# Patient Record
Sex: MALE | Race: WHITE | URBAN - METROPOLITAN AREA
[De-identification: names, ages, dates, MRNs, and addresses within clinical notes are randomized per-mention and may not be internally consistent; named-entity substitution may affect disease eponyms.]

---

## 2017-12-08 ENCOUNTER — APPOINTMENT (OUTPATIENT)
Dept: MRI IMAGING | Facility: CLINIC | Age: 72
DRG: 034 | End: 2017-12-08
Attending: NURSE PRACTITIONER
Payer: COMMERCIAL

## 2017-12-08 ENCOUNTER — APPOINTMENT (OUTPATIENT)
Dept: CARDIOLOGY | Facility: CLINIC | Age: 72
DRG: 034 | End: 2017-12-08
Attending: NURSE PRACTITIONER
Payer: COMMERCIAL

## 2017-12-08 ENCOUNTER — HOSPITAL ENCOUNTER (INPATIENT)
Facility: CLINIC | Age: 72
LOS: 7 days | Discharge: NURSING, REHAB OR LONG-TERM CARE | DRG: 034 | End: 2017-12-15
Attending: PSYCHIATRY & NEUROLOGY | Admitting: PSYCHIATRY & NEUROLOGY
Payer: COMMERCIAL

## 2017-12-08 DIAGNOSIS — E10.8 TYPE 1 DIABETES MELLITUS WITH COMPLICATION (H): ICD-10-CM

## 2017-12-08 DIAGNOSIS — E03.9 HYPOTHYROIDISM, UNSPECIFIED TYPE: ICD-10-CM

## 2017-12-08 DIAGNOSIS — I63.232 CEREBROVASCULAR ACCIDENT (CVA) DUE TO STENOSIS OF LEFT CAROTID ARTERY (H): ICD-10-CM

## 2017-12-08 DIAGNOSIS — K59.00 CONSTIPATION, UNSPECIFIED CONSTIPATION TYPE: ICD-10-CM

## 2017-12-08 DIAGNOSIS — D59.10 AUTOIMMUNE HEMOLYTIC ANEMIA (H): ICD-10-CM

## 2017-12-08 DIAGNOSIS — I10 BENIGN ESSENTIAL HYPERTENSION: ICD-10-CM

## 2017-12-08 DIAGNOSIS — K21.9 GASTROESOPHAGEAL REFLUX DISEASE WITHOUT ESOPHAGITIS: ICD-10-CM

## 2017-12-08 DIAGNOSIS — E63.9 NUTRITIONAL DEFICIENCY: ICD-10-CM

## 2017-12-08 DIAGNOSIS — R06.02 SHORTNESS OF BREATH: Primary | ICD-10-CM

## 2017-12-08 DIAGNOSIS — I65.22 INTERNAL CAROTID ARTERY STENOSIS, LEFT: ICD-10-CM

## 2017-12-08 DIAGNOSIS — R45.1 AGITATION: ICD-10-CM

## 2017-12-08 PROBLEM — I63.9 STROKE (H): Status: ACTIVE | Noted: 2017-12-08

## 2017-12-08 LAB
ALBUMIN SERPL-MCNC: 3.2 G/DL (ref 3.4–5)
ALBUMIN UR-MCNC: 10 MG/DL
ALP SERPL-CCNC: 103 U/L (ref 40–150)
ALT SERPL W P-5'-P-CCNC: 25 U/L (ref 0–70)
ANION GAP SERPL CALCULATED.3IONS-SCNC: 9 MMOL/L (ref 3–14)
APPEARANCE UR: CLEAR
APTT PPP: 29 SEC (ref 22–37)
AST SERPL W P-5'-P-CCNC: 23 U/L (ref 0–45)
BASOPHILS # BLD AUTO: 0 10E9/L (ref 0–0.2)
BASOPHILS NFR BLD AUTO: 0.2 %
BILIRUB DIRECT SERPL-MCNC: 0.6 MG/DL (ref 0–0.2)
BILIRUB SERPL-MCNC: 2.8 MG/DL (ref 0.2–1.3)
BILIRUB UR QL STRIP: NEGATIVE
BUN SERPL-MCNC: 26 MG/DL (ref 7–30)
CALCIUM SERPL-MCNC: 8.8 MG/DL (ref 8.5–10.1)
CHLORIDE SERPL-SCNC: 106 MMOL/L (ref 94–109)
CHOLEST SERPL-MCNC: 105 MG/DL
CO2 SERPL-SCNC: 21 MMOL/L (ref 20–32)
COLOR UR AUTO: YELLOW
CREAT SERPL-MCNC: 1.13 MG/DL (ref 0.66–1.25)
DIFFERENTIAL METHOD BLD: ABNORMAL
EOSINOPHIL # BLD AUTO: 0 10E9/L (ref 0–0.7)
EOSINOPHIL NFR BLD AUTO: 0.1 %
ERYTHROCYTE [DISTWIDTH] IN BLOOD BY AUTOMATED COUNT: 18.1 % (ref 10–15)
FIBRINOGEN PPP-MCNC: 364 MG/DL (ref 200–420)
GFR SERPL CREATININE-BSD FRML MDRD: 64 ML/MIN/1.7M2
GLUCOSE BLDC GLUCOMTR-MCNC: 275 MG/DL (ref 70–99)
GLUCOSE BLDC GLUCOMTR-MCNC: 309 MG/DL (ref 70–99)
GLUCOSE BLDC GLUCOMTR-MCNC: 321 MG/DL (ref 70–99)
GLUCOSE BLDC GLUCOMTR-MCNC: 322 MG/DL (ref 70–99)
GLUCOSE SERPL-MCNC: 265 MG/DL (ref 70–99)
GLUCOSE UR STRIP-MCNC: >1000 MG/DL
HBA1C MFR BLD: 7.3 % (ref 4.3–6)
HCT VFR BLD AUTO: 22.9 % (ref 40–53)
HDLC SERPL-MCNC: 31 MG/DL
HGB BLD-MCNC: 7.8 G/DL (ref 13.3–17.7)
HGB UR QL STRIP: NEGATIVE
IMM GRANULOCYTES # BLD: 0.2 10E9/L (ref 0–0.4)
IMM GRANULOCYTES NFR BLD: 1.7 %
INR PPP: 1.25 (ref 0.86–1.14)
KETONES UR STRIP-MCNC: NEGATIVE MG/DL
LDH SERPL L TO P-CCNC: 340 U/L (ref 85–227)
LDLC SERPL CALC-MCNC: 49 MG/DL
LEUKOCYTE ESTERASE UR QL STRIP: NEGATIVE
LYMPHOCYTES # BLD AUTO: 0.6 10E9/L (ref 0.8–5.3)
LYMPHOCYTES NFR BLD AUTO: 6.8 %
MCH RBC QN AUTO: 32 PG (ref 26.5–33)
MCHC RBC AUTO-ENTMCNC: 34.1 G/DL (ref 31.5–36.5)
MCV RBC AUTO: 94 FL (ref 78–100)
MONOCYTES # BLD AUTO: 0.4 10E9/L (ref 0–1.3)
MONOCYTES NFR BLD AUTO: 4.2 %
MRSA DNA SPEC QL NAA+PROBE: NEGATIVE
MUCOUS THREADS #/AREA URNS LPF: PRESENT /LPF
NEUTROPHILS # BLD AUTO: 7.6 10E9/L (ref 1.6–8.3)
NEUTROPHILS NFR BLD AUTO: 87 %
NITRATE UR QL: NEGATIVE
NONHDLC SERPL-MCNC: 74 MG/DL
NRBC # BLD AUTO: 0.1 10*3/UL
NRBC BLD AUTO-RTO: 1 /100
PH UR STRIP: 5.5 PH (ref 5–7)
PLATELET # BLD AUTO: 237 10E9/L (ref 150–450)
POTASSIUM SERPL-SCNC: 4.6 MMOL/L (ref 3.4–5.3)
PROT SERPL-MCNC: 6.9 G/DL (ref 6.8–8.8)
RADIOLOGIST FLAGS: ABNORMAL
RBC # BLD AUTO: 2.44 10E12/L (ref 4.4–5.9)
RBC #/AREA URNS AUTO: 3 /HPF (ref 0–2)
RETICS # AUTO: 309.8 10E9/L (ref 25–95)
RETICS/RBC NFR AUTO: 12.8 % (ref 0.5–2)
SODIUM SERPL-SCNC: 136 MMOL/L (ref 133–144)
SOURCE: ABNORMAL
SP GR UR STRIP: 1.03 (ref 1–1.03)
SPECIMEN SOURCE: NORMAL
SQUAMOUS #/AREA URNS AUTO: <1 /HPF (ref 0–1)
TRANS CELLS #/AREA URNS HPF: <1 /HPF (ref 0–1)
TRIGL SERPL-MCNC: 125 MG/DL
TROPONIN I SERPL-MCNC: <0.015 UG/L (ref 0–0.04)
UROBILINOGEN UR STRIP-MCNC: 2 MG/DL (ref 0–2)
WBC # BLD AUTO: 8.8 10E9/L (ref 4–11)
WBC #/AREA URNS AUTO: 1 /HPF (ref 0–2)

## 2017-12-08 PROCEDURE — 86880 COOMBS TEST DIRECT: CPT | Performed by: NURSE PRACTITIONER

## 2017-12-08 PROCEDURE — 85045 AUTOMATED RETICULOCYTE COUNT: CPT | Performed by: PSYCHIATRY & NEUROLOGY

## 2017-12-08 PROCEDURE — 40000611 ZZHCL STATISTIC MORPHOLOGY W/INTERP HEMEPATH TC 85060: Performed by: INTERNAL MEDICINE

## 2017-12-08 PROCEDURE — 81001 URINALYSIS AUTO W/SCOPE: CPT | Performed by: NURSE PRACTITIONER

## 2017-12-08 PROCEDURE — 85027 COMPLETE CBC AUTOMATED: CPT | Performed by: PSYCHIATRY & NEUROLOGY

## 2017-12-08 PROCEDURE — 87640 STAPH A DNA AMP PROBE: CPT | Performed by: NURSE PRACTITIONER

## 2017-12-08 PROCEDURE — 80061 LIPID PANEL: CPT | Performed by: PSYCHIATRY & NEUROLOGY

## 2017-12-08 PROCEDURE — 85730 THROMBOPLASTIN TIME PARTIAL: CPT | Performed by: PSYCHIATRY & NEUROLOGY

## 2017-12-08 PROCEDURE — 00000146 ZZHCL STATISTIC GLUCOSE BY METER IP

## 2017-12-08 PROCEDURE — 87641 MR-STAPH DNA AMP PROBE: CPT | Performed by: NURSE PRACTITIONER

## 2017-12-08 PROCEDURE — 86880 COOMBS TEST DIRECT: CPT | Performed by: INTERNAL MEDICINE

## 2017-12-08 PROCEDURE — 20000004 ZZH R&B ICU UMMC

## 2017-12-08 PROCEDURE — 86900 BLOOD TYPING SEROLOGIC ABO: CPT | Performed by: NURSE PRACTITIONER

## 2017-12-08 PROCEDURE — 93306 TTE W/DOPPLER COMPLETE: CPT | Mod: 26 | Performed by: INTERNAL MEDICINE

## 2017-12-08 PROCEDURE — 83036 HEMOGLOBIN GLYCOSYLATED A1C: CPT | Performed by: PSYCHIATRY & NEUROLOGY

## 2017-12-08 PROCEDURE — 85610 PROTHROMBIN TIME: CPT | Performed by: PSYCHIATRY & NEUROLOGY

## 2017-12-08 PROCEDURE — 25000125 ZZHC RX 250: Performed by: STUDENT IN AN ORGANIZED HEALTH CARE EDUCATION/TRAINING PROGRAM

## 2017-12-08 PROCEDURE — 86870 RBC ANTIBODY IDENTIFICATION: CPT | Performed by: NURSE PRACTITIONER

## 2017-12-08 PROCEDURE — 86860 RBC ANTIBODY ELUTION: CPT | Performed by: NURSE PRACTITIONER

## 2017-12-08 PROCEDURE — 36415 COLL VENOUS BLD VENIPUNCTURE: CPT | Performed by: PSYCHIATRY & NEUROLOGY

## 2017-12-08 PROCEDURE — 84484 ASSAY OF TROPONIN QUANT: CPT | Performed by: PSYCHIATRY & NEUROLOGY

## 2017-12-08 PROCEDURE — 25500064 ZZH RX 255 OP 636: Performed by: INTERNAL MEDICINE

## 2017-12-08 PROCEDURE — 25000128 H RX IP 250 OP 636: Performed by: PSYCHIATRY & NEUROLOGY

## 2017-12-08 PROCEDURE — 40000264 ECHO COMPLETE BUBBLE STUDY WITH OPTISON

## 2017-12-08 PROCEDURE — 70549 MR ANGIOGRAPH NECK W/O&W/DYE: CPT

## 2017-12-08 PROCEDURE — 70553 MRI BRAIN STEM W/O & W/DYE: CPT

## 2017-12-08 PROCEDURE — 25000125 ZZHC RX 250: Performed by: NURSE PRACTITIONER

## 2017-12-08 PROCEDURE — 85004 AUTOMATED DIFF WBC COUNT: CPT | Performed by: PSYCHIATRY & NEUROLOGY

## 2017-12-08 PROCEDURE — 80048 BASIC METABOLIC PNL TOTAL CA: CPT | Performed by: PSYCHIATRY & NEUROLOGY

## 2017-12-08 PROCEDURE — 93005 ELECTROCARDIOGRAM TRACING: CPT

## 2017-12-08 PROCEDURE — 86850 RBC ANTIBODY SCREEN: CPT | Performed by: NURSE PRACTITIONER

## 2017-12-08 PROCEDURE — 25000128 H RX IP 250 OP 636

## 2017-12-08 PROCEDURE — 86901 BLOOD TYPING SEROLOGIC RH(D): CPT | Performed by: NURSE PRACTITIONER

## 2017-12-08 PROCEDURE — 85384 FIBRINOGEN ACTIVITY: CPT | Performed by: PSYCHIATRY & NEUROLOGY

## 2017-12-08 PROCEDURE — 36415 COLL VENOUS BLD VENIPUNCTURE: CPT | Performed by: NURSE PRACTITIONER

## 2017-12-08 PROCEDURE — 93010 ELECTROCARDIOGRAM REPORT: CPT | Performed by: INTERNAL MEDICINE

## 2017-12-08 PROCEDURE — 25000128 H RX IP 250 OP 636: Performed by: NURSE PRACTITIONER

## 2017-12-08 PROCEDURE — A9585 GADOBUTROL INJECTION: HCPCS | Performed by: PSYCHIATRY & NEUROLOGY

## 2017-12-08 PROCEDURE — 83615 LACTATE (LD) (LDH) ENZYME: CPT | Performed by: PSYCHIATRY & NEUROLOGY

## 2017-12-08 PROCEDURE — 80076 HEPATIC FUNCTION PANEL: CPT | Performed by: PSYCHIATRY & NEUROLOGY

## 2017-12-08 RX ORDER — HYDRALAZINE HYDROCHLORIDE 20 MG/ML
10 INJECTION INTRAMUSCULAR; INTRAVENOUS EVERY 6 HOURS PRN
Status: DISCONTINUED | OUTPATIENT
Start: 2017-12-08 | End: 2017-12-09

## 2017-12-08 RX ORDER — NICOTINE POLACRILEX 4 MG
15-30 LOZENGE BUCCAL
Status: DISCONTINUED | OUTPATIENT
Start: 2017-12-08 | End: 2017-12-08

## 2017-12-08 RX ORDER — NICOTINE POLACRILEX 4 MG
15-30 LOZENGE BUCCAL
Status: DISCONTINUED | OUTPATIENT
Start: 2017-12-08 | End: 2017-12-10

## 2017-12-08 RX ORDER — MAGNESIUM SULFATE HEPTAHYDRATE 40 MG/ML
4 INJECTION, SOLUTION INTRAVENOUS EVERY 4 HOURS PRN
Status: DISCONTINUED | OUTPATIENT
Start: 2017-12-08 | End: 2017-12-15 | Stop reason: HOSPADM

## 2017-12-08 RX ORDER — SODIUM CHLORIDE 9 MG/ML
INJECTION, SOLUTION INTRAVENOUS CONTINUOUS
Status: DISCONTINUED | OUTPATIENT
Start: 2017-12-08 | End: 2017-12-09

## 2017-12-08 RX ORDER — ONDANSETRON 4 MG/1
4 TABLET, ORALLY DISINTEGRATING ORAL EVERY 6 HOURS PRN
Status: DISCONTINUED | OUTPATIENT
Start: 2017-12-08 | End: 2017-12-09

## 2017-12-08 RX ORDER — NALOXONE HYDROCHLORIDE 0.4 MG/ML
.1-.4 INJECTION, SOLUTION INTRAMUSCULAR; INTRAVENOUS; SUBCUTANEOUS
Status: DISCONTINUED | OUTPATIENT
Start: 2017-12-08 | End: 2017-12-15 | Stop reason: HOSPADM

## 2017-12-08 RX ORDER — POTASSIUM CHLORIDE 7.45 MG/ML
10 INJECTION INTRAVENOUS
Status: DISCONTINUED | OUTPATIENT
Start: 2017-12-08 | End: 2017-12-15 | Stop reason: RX

## 2017-12-08 RX ORDER — GADOBUTROL 604.72 MG/ML
10 INJECTION INTRAVENOUS ONCE
Status: COMPLETED | OUTPATIENT
Start: 2017-12-08 | End: 2017-12-08

## 2017-12-08 RX ORDER — LIDOCAINE 40 MG/G
CREAM TOPICAL
Status: DISCONTINUED | OUTPATIENT
Start: 2017-12-08 | End: 2017-12-15 | Stop reason: HOSPADM

## 2017-12-08 RX ORDER — DEXTROSE MONOHYDRATE 25 G/50ML
25-50 INJECTION, SOLUTION INTRAVENOUS
Status: DISCONTINUED | OUTPATIENT
Start: 2017-12-08 | End: 2017-12-08

## 2017-12-08 RX ORDER — ONDANSETRON 2 MG/ML
4 INJECTION INTRAMUSCULAR; INTRAVENOUS EVERY 6 HOURS PRN
Status: DISCONTINUED | OUTPATIENT
Start: 2017-12-08 | End: 2017-12-09

## 2017-12-08 RX ORDER — POTASSIUM CHLORIDE 29.8 MG/ML
20 INJECTION INTRAVENOUS
Status: DISCONTINUED | OUTPATIENT
Start: 2017-12-08 | End: 2017-12-08 | Stop reason: RX

## 2017-12-08 RX ORDER — ASPIRIN 300 MG/1
300 SUPPOSITORY RECTAL EVERY 24 HOURS
Status: DISCONTINUED | OUTPATIENT
Start: 2017-12-09 | End: 2017-12-09

## 2017-12-08 RX ORDER — LABETALOL HYDROCHLORIDE 5 MG/ML
10 INJECTION, SOLUTION INTRAVENOUS EVERY 10 MIN PRN
Status: DISCONTINUED | OUTPATIENT
Start: 2017-12-08 | End: 2017-12-09

## 2017-12-08 RX ORDER — POTASSIUM CHLORIDE 1.5 G/1.58G
20-40 POWDER, FOR SOLUTION ORAL
Status: DISCONTINUED | OUTPATIENT
Start: 2017-12-08 | End: 2017-12-15 | Stop reason: HOSPADM

## 2017-12-08 RX ORDER — DEXMEDETOMIDINE HYDROCHLORIDE 4 UG/ML
0.2-0.7 INJECTION, SOLUTION INTRAVENOUS CONTINUOUS
Status: DISCONTINUED | OUTPATIENT
Start: 2017-12-08 | End: 2017-12-11 | Stop reason: DRUGHIGH

## 2017-12-08 RX ORDER — DEXTROSE MONOHYDRATE 25 G/50ML
25-50 INJECTION, SOLUTION INTRAVENOUS
Status: DISCONTINUED | OUTPATIENT
Start: 2017-12-08 | End: 2017-12-10

## 2017-12-08 RX ORDER — HALOPERIDOL 5 MG/ML
INJECTION INTRAMUSCULAR
Status: DISCONTINUED
Start: 2017-12-08 | End: 2017-12-09 | Stop reason: HOSPADM

## 2017-12-08 RX ORDER — POTASSIUM CL/LIDO/0.9 % NACL 10MEQ/0.1L
10 INTRAVENOUS SOLUTION, PIGGYBACK (ML) INTRAVENOUS
Status: DISCONTINUED | OUTPATIENT
Start: 2017-12-08 | End: 2017-12-15 | Stop reason: HOSPADM

## 2017-12-08 RX ORDER — POTASSIUM CHLORIDE 750 MG/1
20-40 TABLET, EXTENDED RELEASE ORAL
Status: DISCONTINUED | OUTPATIENT
Start: 2017-12-08 | End: 2017-12-15 | Stop reason: HOSPADM

## 2017-12-08 RX ORDER — HALOPERIDOL 5 MG/ML
5 INJECTION INTRAMUSCULAR ONCE
Status: DISCONTINUED | OUTPATIENT
Start: 2017-12-08 | End: 2017-12-08

## 2017-12-08 RX ADMIN — THIAMINE HYDROCHLORIDE 300 MG: 100 INJECTION, SOLUTION INTRAMUSCULAR; INTRAVENOUS at 23:50

## 2017-12-08 RX ADMIN — DEXMEDETOMIDINE HYDROCHLORIDE 0.2 MCG/KG/HR: 4 INJECTION, SOLUTION INTRAVENOUS at 21:15

## 2017-12-08 RX ADMIN — HUMAN ALBUMIN MICROSPHERES AND PERFLUTREN 6 ML: 10; .22 INJECTION, SOLUTION INTRAVENOUS at 16:00

## 2017-12-08 RX ADMIN — SODIUM CHLORIDE: 9 INJECTION, SOLUTION INTRAVENOUS at 23:59

## 2017-12-08 RX ADMIN — HUMAN INSULIN 4 UNITS/HR: 100 INJECTION, SOLUTION SUBCUTANEOUS at 19:09

## 2017-12-08 RX ADMIN — GADOBUTROL 10 ML: 604.72 INJECTION INTRAVENOUS at 18:29

## 2017-12-08 ASSESSMENT — VISUAL ACUITY
OU: NORMAL ACUITY
OU: GLASSES
OU: NORMAL ACUITY
OU: NORMAL ACUITY

## 2017-12-08 NOTE — IP AVS SNAPSHOT
[address] [address]    Phone:  [phone]                                       After Visit Summary   12/8/2017    Seven Cat    MRN: [id number]           After Visit Summary Signature Page     I have received my discharge instructions, and my questions have been answered. I have discussed any challenges I see with this plan with the nurse or doctor.    ..........................................................................................................................................  Patient/Patient Representative Signature      ..........................................................................................................................................  Patient Representative Print Name and Relationship to Patient    ..................................................               ................................................  Date                                            Time    ..........................................................................................................................................  Reviewed by Signature/Title    ...................................................              ..............................................  Date                                                            Time

## 2017-12-08 NOTE — PROGRESS NOTES
[facility] at 1425. Neuro check performed with transport team. MD notified of pt's arrival.

## 2017-12-08 NOTE — IP AVS SNAPSHOT
MRN:[id number]                      After Visit Summary   12/8/2017    Seven Cat    MRN: [id number]           Thank you!     Thank you for choosing [facility] for your care. Our goal is always to provide you with excellent care. Hearing back from our patients is one way we can continue to improve our services. Please take a few minutes to complete the written survey that you may receive in the mail after you visit with us. Thank you!        Patient Information     Date Of Birth          1945        Designated Caregiver       Most Recent Value    Caregiver    Will someone help with your care after discharge? no      About your hospital stay     You were admitted on:  December 8, 2017 You last received care in the:  [address] [facility] [place]    You were discharged on:  December 15, 2017        Reason for your hospital stay       You were hospitalized for a left sided stroke causing it to be difficult to talk.                  Who to Call     For medical emergencies, please call 911.  For non-urgent questions about your medical care, please call your primary care provider or clinic, [phone]          Attending Provider     Provider Specialty    Timbo Bullard MD Internal Medicine       Primary Care Provider Office Phone # Fax #    Jeff Millie [phone]      After Care Instructions     Activity - Up with assistive device           Additional Discharge Instructions       Take aspirin, plavix and atorvastatin daily for your stent.    Regarding your insulin, take NPH 30u BID. Take high dose SSI and carb coverage as described.    Regarding your prednisone:  Recommend decreasing prednisone to 40 mg BID starting 12/16, for 7 days duration, then 60 mg QD for 7 days, then 40 mg QD for 7 days, then 20 mg QD for 7 days, then discontinue.    Follow up as above            Advance Diet as Tolerated       Follow this diet upon discharge: Orders Placed This Encounter       Snacks/Supplements Adult: Magic Cup; With Meals      Combination Diet 6445-2768 Calories: Moderate Consistent CHO (4-6 CHO units/meal); Nectar Thickened Liquids (pre-thickened or use instant food thickener); Dysphagia Diet Level 2: Mechan Altered; Nectar Thickened Liquids (pre-thickened or use insta...            General info for SNF       Length of Stay Estimate: Short Term Care: Estimated # of Days <30  Condition at Discharge: Stable  Level of care:skilled   Rehabilitation Potential: Good  Admission H&P remains valid and up-to-date: Yes  Recent Chemotherapy: N/A  Use Nursing Home Standing Orders: Yes            Glucose monitor nursing POCT       Before meals and at bedtime            Mantoux instructions       Give two-step Mantoux (PPD) Per Facility Policy Yes                  Follow-up Appointments     Follow Up and recommended labs and tests       Follow up with long term physician.  The following labs/tests are recommended: CBC weekly.  Follow up with neurology in 1-2 months  Follow up with hematology in 4 weeks.  Follow up with endocrinology in 1-2 weeks.                  Additional Services     Occupational Therapy Adult Consult       Evaluate and treat as clinically indicated.    Reason:  stroke            Physical Therapy Adult Consult       Evaluate and treat as clinically indicated.    Reason:  stroke            Speech Language Path Adult Consult       Evaluate and treat as clinically indicated.    Reason:  stroke                  Pending Results     Date and Time Order Name Status Description    12/14/2017 2200 Haptoglobin In process     12/11/2017 0136 Blood culture Preliminary             Statement of Approval     Ordered          12/15/17 0895  I have reviewed and agree with all the recommendations and orders detailed in this document.  EFFECTIVE NOW     Approved and electronically signed by:  Denis Ojeda MD             Admission Information     Date & Time Provider Department Dept. Phone     "2017 Timbo Bullard MD [address] [facility] [phone]      Your Vitals Were     Blood Pressure Pulse Temperature Respirations Height Weight    136/64 74 97.5  F (36.4  C) (Oral) 17 1.778 m (5' 10\") 124.7 kg (274 lb 14.6 oz)    Pulse Oximetry BMI (Body Mass Index)                96% 39.45 kg/m2          [employer] Information     [employer] lets you send messages to your doctor, view your test results, renew your prescriptions, schedule appointments and more. To sign up, go to [email or web address] . Click on \"Log in\" on the left side of the screen, which will take you to the Welcome page. Then click on \"Sign up Now\" on the right side of the page.     You will be asked to enter the access code listed below, as well as some personal information. Please follow the directions to create your username and password.     Your access code is: [email or web address]  Expires: 3/14/2018  8:50 AM     Your access code will  in 90 days. If you need help or a new code, please call your [facility] clinic or [phone].        Care EveryWhere ID     This is your Care EveryWhere ID. This could be used by other organizations to access your [facility] medical records  [id number]        Equal Access to Services     LINDA LEDBETTER AH: Hadjonathan newman Sodexter, waaxda luqadaha, qaybta kaalcelestina allen. So [facility] [phone].    ATENCIÓN: Si habla español, tiene a weaver disposición servicios gratuitos de asistencia lingüística. Erwin al [phone].    We comply with applicable federal civil rights laws and [place] laws. We do not discriminate on the basis of race, color, national origin, age, disability, sex, sexual orientation, or gender identity.               Review of your medicines      START taking        Dose / Directions    albuterol 108 (90 BASE) MCG/ACT Inhaler   Commonly known as:  PROAIR HFA/PROVENTIL HFA/VENTOLIN HFA   Used for:  Shortness of breath        Dose:  2 " puff   Inhale 2 puffs into the lungs every 4 hours as needed for shortness of breath / dyspnea   Refills:  0       amLODIPine 5 MG tablet   Commonly known as:  NORVASC   Used for:  Benign essential hypertension        Dose:  5 mg   Start taking on:  12/16/2017   Take 1 tablet (5 mg) by mouth daily   Quantity:  30 tablet   Refills:  0       clopidogrel 75 MG tablet   Commonly known as:  PLAVIX   Used for:  Internal carotid artery stenosis, left        Dose:  75 mg   Start taking on:  12/16/2017   1 tablet (75 mg) by Oral or Feeding Tube route daily   Quantity:  30 tablet   Refills:  0       lisinopril 20 MG tablet   Commonly known as:  PRINIVIL/ZESTRIL   Used for:  Benign essential hypertension        Dose:  20 mg   1 tablet (20 mg) by Oral or Feeding Tube route 2 times daily   Quantity:  30 tablet   Refills:  0       pantoprazole 40 MG EC tablet   Commonly known as:  PROTONIX   Used for:  Gastroesophageal reflux disease without esophagitis        Dose:  40 mg   Start taking on:  12/16/2017   Take 1 tablet (40 mg) by mouth every morning   Quantity:  30 tablet   Refills:  0       polyethylene glycol Packet   Commonly known as:  MIRALAX/GLYCOLAX   Used for:  Constipation, unspecified constipation type        Dose:  17 g   Start taking on:  12/16/2017   Take 17 g by mouth daily   Quantity:  7 packet   Refills:  0       predniSONE 20 MG tablet   Commonly known as:  DELTASONE   Used for:  Autoimmune hemolytic anemia (H)        Recommend decreasing prednisone to 40 mg BID starting 12/16, for 7 days duration, then 60 mg QD for 7 days, then 40 mg QD for 7 days, then 20 mg QD for 7 days, then discontinue.   Refills:  0       * QUEtiapine 50 MG tablet   Commonly known as:  SEROquel   Used for:  Agitation        Dose:  50 mg   Take 1 tablet (50 mg) by mouth every evening   Quantity:  120 tablet   Refills:  0       * QUEtiapine 25 MG tablet   Commonly known as:  SEROquel   Used for:  Agitation        Dose:  25 mg   Start taking  on:  12/16/2017   Take 1 tablet (25 mg) by mouth daily   Quantity:  60 tablet   Refills:  0       senna-docusate 8.6-50 MG per tablet   Commonly known as:  SENOKOT-S;PERICOLACE   Used for:  Constipation, unspecified constipation type        Dose:  2 tablet   Take 2 tablets by mouth 2 times daily   Quantity:  100 tablet   Refills:  0       * Notice:  This list has 2 medication(s) that are the same as other medications prescribed for you. Read the directions carefully, and ask your doctor or other care provider to review them with you.      CONTINUE these medicines which may have CHANGED, or have new prescriptions. If we are uncertain of the size of tablets/capsules you have at home, strength may be listed as something that might have changed.        Dose / Directions    aspirin 325 MG tablet   This may have changed:    - medication strength  - how much to take  - how to take this   Used for:  Internal carotid artery stenosis, left        Dose:  325 mg   Start taking on:  12/16/2017   1 tablet (325 mg) by Oral or Feeding Tube route daily   Quantity:  120 tablet   Refills:  0       atorvastatin 40 MG tablet   Commonly known as:  LIPITOR   This may have changed:    - medication strength  - how to take this   Used for:  Internal carotid artery stenosis, left        Dose:  40 mg   Start taking on:  12/16/2017   1 tablet (40 mg) by Oral or Feeding Tube route daily   Quantity:  30 tablet   Refills:  0       cholecalciferol 1000 UNITS Tabs   This may have changed:  medication strength   Used for:  Nutritional deficiency        Dose:  1000 Units   Start taking on:  12/16/2017   Take 1,000 Units by mouth daily   Quantity:  30 tablet   Refills:  0       folic acid 1 MG tablet   Commonly known as:  FOLVITE   This may have changed:  medication strength   Used for:  Nutritional deficiency        Dose:  1 mg   Start taking on:  12/16/2017   Take 1 tablet (1 mg) by mouth daily   Quantity:  30 tablet   Refills:  0       * insulin  aspart 100 UNIT/ML injection   Commonly known as:  NovoLOG PEN   This may have changed:  additional instructions   Used for:  Type 1 diabetes mellitus with complication (H)        Subcutaneous, WITH SNACKS OR SUPPLEMENTS, high blood sugar, Starting Mon 12/11/17 at 1602 DOSE:??1 units per 5 grams of carbohydrate. Only chart total amount of units given.??Do not give if pre-prandial glucose is less than 60 mg/dL.?If given at mealtime, must be administered 5 min before meal or immediately after.   Refills:  0       * insulin aspart 100 UNIT/ML injection   Commonly known as:  NovoLOG PEN   This may have changed:  You were already taking a medication with the same name, and this prescription was added. Make sure you understand how and when to take each.   Used for:  Type 1 diabetes mellitus with complication (H)        Subcutaneous, 3 TIMES DAILY WITH MEALS, First dose on Fri 12/15/17 at 1045 DOSE:??1 units per 5 grams of carbohydrate.??Only chart total amount of units given.??Do not give if pre-prandial glucose is less than 60 mg/dL.?If given at mealtime, must be administered 5 min before meal or immediately after.   Refills:  0       * insulin aspart 100 UNIT/ML injection   Commonly known as:  NovoLOG PEN   This may have changed:  You were already taking a medication with the same name, and this prescription was added. Make sure you understand how and when to take each.   Used for:  Type 1 diabetes mellitus with complication (H)        Correction Scale - HIGH INSULIN RESISTANCE DOSING    Do Not give Correction Insulin if Pre-Meal BG less than 140.  For Pre-Meal  - 164 give 1 unit.  For Pre-Meal  - 189 give 2 units.  For Pre-Meal  - 214 give 3 units.  For Pre-Meal  - 239 give 4 units.  For Pre-Meal  - 264 give 5 units.  For Pre-Meal  - 289 give 6 units.  For Pre-Meal  - 314 give 7 units.  For Pre-Meal  - 339 give 8 units.   Refills:  0       * insulin aspart 100 UNIT/ML  injection   Commonly known as:  NovoLOG PEN   This may have changed:  You were already taking a medication with the same name, and this prescription was added. Make sure you understand how and when to take each.   Used for:  Type 1 diabetes mellitus with complication (H)        HIGH INSULIN RESISTANCE DOSING   Do Not give Bedtime Correction Insulin if BG less than 200.  For  - 224 give 1 units.  For  - 249 give 2 units.  For  - 274 give 3 units.  For  - 299 give 4 units.  For  - 324 give 5 units.  For  - 349 give 6 units.  For BG greater than or equal to 350 give 7 units.  Notify provider if glucose greater than or equal to 350 mg/dL after administration of correction dose.   Refills:  0       * insulin isophane human 100 UNIT/ML injection   Commonly known as:  HumuLIN N PEN   This may have changed:    - medication strength  - how much to take  - when to take this   Used for:  Type 1 diabetes mellitus with complication (H)        Dose:  30 Units   Inject 30 Units Subcutaneous every 24 hours   Refills:  0       * insulin isophane human 100 UNIT/ML injection   Commonly known as:  HumuLIN N PEN   This may have changed:  You were already taking a medication with the same name, and this prescription was added. Make sure you understand how and when to take each.   Used for:  Type 1 diabetes mellitus with complication (H)        Dose:  30 Units   Start taking on:  12/16/2017   Inject 30 Units Subcutaneous every 24 hours   Refills:  0       levothyroxine 175 MCG tablet   Commonly known as:  SYNTHROID/LEVOTHROID   This may have changed:    - medication strength  - how to take this  - when to take this   Used for:  Hypothyroidism, unspecified type        Dose:  175 mcg   Start taking on:  12/16/2017   1 tablet (175 mcg) by Oral or Feeding Tube route every morning (before breakfast)   Quantity:  30 tablet   Refills:  0       * Notice:  This list has 6 medication(s) that are the same as other  medications prescribed for you. Read the directions carefully, and ask your doctor or other care provider to review them with you.      STOP taking     TAMSULOSIN HCL PO                Where to get your medicines      Some of these will need a paper prescription and others can be bought over the counter. Ask your nurse if you have questions.     You don't need a prescription for these medications     albuterol 108 (90 BASE) MCG/ACT Inhaler    amLODIPine 5 MG tablet    aspirin 325 MG tablet    atorvastatin 40 MG tablet    cholecalciferol 1000 UNITS Tabs    clopidogrel 75 MG tablet    folic acid 1 MG tablet    insulin aspart 100 UNIT/ML injection    insulin aspart 100 UNIT/ML injection    insulin aspart 100 UNIT/ML injection    insulin aspart 100 UNIT/ML injection    insulin isophane human 100 UNIT/ML injection    insulin isophane human 100 UNIT/ML injection    levothyroxine 175 MCG tablet    lisinopril 20 MG tablet    pantoprazole 40 MG EC tablet    polyethylene glycol Packet    predniSONE 20 MG tablet    QUEtiapine 25 MG tablet    QUEtiapine 50 MG tablet    senna-docusate 8.6-50 MG per tablet                Protect others around you: Learn how to safely use, store and throw away your medicines at www.disposemymeds.org.             Medication List: This is a list of all your medications and when to take them. Check marks below indicate your daily home schedule. Keep this list as a reference.      Medications           Morning Afternoon Evening Bedtime As Needed    albuterol 108 (90 BASE) MCG/ACT Inhaler   Commonly known as:  PROAIR HFA/PROVENTIL HFA/VENTOLIN HFA   Inhale 2 puffs into the lungs every 4 hours as needed for shortness of breath / dyspnea                                amLODIPine 5 MG tablet   Commonly known as:  NORVASC   Take 1 tablet (5 mg) by mouth daily   Start taking on:  12/16/2017   Last time this was given:  5 mg on 12/15/2017  8:41 AM                                aspirin 325 MG tablet   1  tablet (325 mg) by Oral or Feeding Tube route daily   Start taking on:  12/16/2017   Last time this was given:  325 mg on 12/15/2017  8:42 AM                                atorvastatin 40 MG tablet   Commonly known as:  LIPITOR   1 tablet (40 mg) by Oral or Feeding Tube route daily   Start taking on:  12/16/2017   Last time this was given:  40 mg on 12/15/2017  8:42 AM                                cholecalciferol 1000 UNITS Tabs   Take 1,000 Units by mouth daily   Start taking on:  12/16/2017   Last time this was given:  1,000 Units on 12/15/2017  8:43 AM                                clopidogrel 75 MG tablet   Commonly known as:  PLAVIX   1 tablet (75 mg) by Oral or Feeding Tube route daily   Start taking on:  12/16/2017   Last time this was given:  75 mg on 12/15/2017  8:41 AM                                folic acid 1 MG tablet   Commonly known as:  FOLVITE   Take 1 tablet (1 mg) by mouth daily   Start taking on:  12/16/2017   Last time this was given:  1 mg on 12/15/2017  8:42 AM                                * insulin aspart 100 UNIT/ML injection   Commonly known as:  NovoLOG PEN   Subcutaneous, WITH SNACKS OR SUPPLEMENTS, high blood sugar, Starting Mon 12/11/17 at 1602 DOSE:??1 units per 5 grams of carbohydrate. Only chart total amount of units given.??Do not give if pre-prandial glucose is less than 60 mg/dL.?If given at mealtime, must be administered 5 min before meal or immediately after.   Last time this was given:  22 Units on 12/15/2017  1:26 PM                                * insulin aspart 100 UNIT/ML injection   Commonly known as:  NovoLOG PEN   Subcutaneous, 3 TIMES DAILY WITH MEALS, First dose on Fri 12/15/17 at 1045 DOSE:??1 units per 5 grams of carbohydrate.??Only chart total amount of units given.??Do not give if pre-prandial glucose is less than 60 mg/dL.?If given at mealtime, must be administered 5 min before meal or immediately after.   Last time this was given:  22 Units on 12/15/2017   1:26 PM                                * insulin aspart 100 UNIT/ML injection   Commonly known as:  NovoLOG PEN   Correction Scale - HIGH INSULIN RESISTANCE DOSING    Do Not give Correction Insulin if Pre-Meal BG less than 140.  For Pre-Meal  - 164 give 1 unit.  For Pre-Meal  - 189 give 2 units.  For Pre-Meal  - 214 give 3 units.  For Pre-Meal  - 239 give 4 units.  For Pre-Meal  - 264 give 5 units.  For Pre-Meal  - 289 give 6 units.  For Pre-Meal  - 314 give 7 units.  For Pre-Meal  - 339 give 8 units.   Last time this was given:  22 Units on 12/15/2017  1:26 PM                                * insulin aspart 100 UNIT/ML injection   Commonly known as:  NovoLOG PEN   HIGH INSULIN RESISTANCE DOSING   Do Not give Bedtime Correction Insulin if BG less than 200.  For  - 224 give 1 units.  For  - 249 give 2 units.  For  - 274 give 3 units.  For  - 299 give 4 units.  For  - 324 give 5 units.  For  - 349 give 6 units.  For BG greater than or equal to 350 give 7 units.  Notify provider if glucose greater than or equal to 350 mg/dL after administration of correction dose.   Last time this was given:  22 Units on 12/15/2017  1:26 PM                                * insulin isophane human 100 UNIT/ML injection   Commonly known as:  HumuLIN N PEN   Inject 30 Units Subcutaneous every 24 hours   Last time this was given:  36 Units on 12/15/2017 10:07 AM                                * insulin isophane human 100 UNIT/ML injection   Commonly known as:  HumuLIN N PEN   Inject 30 Units Subcutaneous every 24 hours   Start taking on:  12/16/2017   Last time this was given:  36 Units on 12/15/2017 10:07 AM                                levothyroxine 175 MCG tablet   Commonly known as:  SYNTHROID/LEVOTHROID   1 tablet (175 mcg) by Oral or Feeding Tube route every morning (before breakfast)   Start taking on:  12/16/2017   Last time this was given:  175  mcg on 12/15/2017  8:42 AM                                lisinopril 20 MG tablet   Commonly known as:  PRINIVIL/ZESTRIL   1 tablet (20 mg) by Oral or Feeding Tube route 2 times daily   Last time this was given:  20 mg on 12/15/2017  8:42 AM                                pantoprazole 40 MG EC tablet   Commonly known as:  PROTONIX   Take 1 tablet (40 mg) by mouth every morning   Start taking on:  12/16/2017   Last time this was given:  40 mg on 12/15/2017  8:42 AM                                polyethylene glycol Packet   Commonly known as:  MIRALAX/GLYCOLAX   Take 17 g by mouth daily   Start taking on:  12/16/2017   Last time this was given:  17 g on 12/14/2017  8:33 AM                                predniSONE 20 MG tablet   Commonly known as:  DELTASONE   Recommend decreasing prednisone to 40 mg BID starting 12/16, for 7 days duration, then 60 mg QD for 7 days, then 40 mg QD for 7 days, then 20 mg QD for 7 days, then discontinue.   Last time this was given:  60 mg on 12/15/2017  8:42 AM                                * QUEtiapine 50 MG tablet   Commonly known as:  SEROquel   Take 1 tablet (50 mg) by mouth every evening   Last time this was given:  25 mg on 12/15/2017  8:42 AM                                * QUEtiapine 25 MG tablet   Commonly known as:  SEROquel   Take 1 tablet (25 mg) by mouth daily   Start taking on:  12/16/2017   Last time this was given:  25 mg on 12/15/2017  8:42 AM                                senna-docusate 8.6-50 MG per tablet   Commonly known as:  SENOKOT-S;PERICOLACE   Take 2 tablets by mouth 2 times daily   Last time this was given:  2 tablets on 12/15/2017  8:43 AM                                * Notice:  This list has 8 medication(s) that are the same as other medications prescribed for you. Read the directions carefully, and ask your doctor or other care provider to review them with you.

## 2017-12-08 NOTE — H&P
[facility], [place]    NEUROCRITICAL CARE H&P NOTE      Patient Name:  Seven Cat       Date of Admission:  12/8/2017       Problem List    Active Hospital Problems    Stroke (H)         Stroke Risk Factors:   DM, HTN, HLD, smoking.    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  HTN  Autoimmune/idiopathic hemolytic anemia  HLD  BPH  Cervical radiculopathy    Past Surgical History   No past surgical history on file.    Family history:  Parkinson disease. Thyroid disease.    Social history:  Smokes cigar daily. Does not drink alcohol. Lives with his wife.    Allergies   Allergies not on file       H&P:    This is a 72 year old man with PMH as above [facility] with L MCA syndrome. Last known normal time was 10:45 AM. He was found to be confused and dysarthric by [facility] who was checking the glucose. On exam, he had right sided weakness, dysarthria, right facial droop. NIHSS was 4. Blood glucose was 400.     The CT head showed a left hyperdense MCA sign. However, the CTA head and neck did not show any MCA occlusion. He did have a critical left ICA stenosis. There was also a lingering question of M2 partial occlusion. He was given IV tPA and was transferred to us for consideration of intervention. We [facility] not have images from [facility] yet. We will get MRI Brain and MRA head and neck stat.    Premise to the transfer - [facility] and was found to have a Hb of 5.5- has history of autoimmune hemolytic anemia. He was given multiple transfusions and was started on steroids for the autoimmune hemolytic anemia. Had significant glucose fluctuations during the admission per charts [facility] the [facility].      Present Medications    [START ON 12/9/2017] aspirin  325 mg Oral Q24H    Or     [START ON 12/9/2017] aspirin  325 mg Oral or NG Tube Q24H    Or     [START ON 12/9/2017] aspirin  300  mg Rectal Q24H     pantoprazole (PROTONIX) IV PEDS/NICU  40 mg Intravenous Daily     sodium chloride (PF)  3 mL Intracatheter Q8H     insulin aspart  1-6 Units Subcutaneous Q4H       - MEDICATION INSTRUCTIONS -       - MEDICATION INSTRUCTIONS -       NaCl         EXAMINATION    Vital Signs  /66  Temp 98.2  F (36.8  C) (Oral)  Resp 23  SpO2 97%    Tmax: Temp (24hrs), Av.2  F (36.8  C), Min:98.2  F (36.8  C), Max:98.2  F (36.8  C)      Intake/Output:   Intake/Output Summary (Last 24 hours) at 17 1520  Last data filed at 17 1430   Gross per 24 hour   Intake                0 ml   Output              200 ml   Net             -200 ml       General Examination   General State of Health: healthy looking, not in distress, confused.  Level of Alertness: awake, alert  Orientation: x 3 (time, place, person)  HEENT: intact    Nutrition   Active Diet Order      NPO for Medical/Clinical Reasons Except for: No Exceptions    NeuroCritical Neurologic Examination  Cranial nerve examination: CN II-XII intact .  OBDULIO bilaterally  Corneal reflexes: present  Gag reflex: present  Oculocephalic reflex: present  Extra ocular muscle movement: intact  Dysarthria: absent  Aphasia: intact expression and comprehension  Motor: power, full strength globally, normotonic  Sensation: intact to light touch, pressure, pain and temperature; normal joint position and proprioception  Reflexes: deep tendon reflexes normal  Cerebellar Exam: normal gait, no ataxia, no dysmetria    Cardiovascular:  RRR  Pulmonary:  no respiratory distress  Abdominal:  soft, non-tender, non-distended  Extremities:  no edema, peripheral pulses palpable     National [employer] Stroke Scale  Exam Interval: 2 hours post treatment   Score    Level of consciousness: (0)   Alert, keenly responsive    LOC questions: (0)   Answers both questions correctly    LOC commands: (0)   Performs both tasks correctly    Best gaze: (0)   Normal    Visual: (0)    No visual loss    Facial palsy: (0)   Normal symmetrical movements    Motor arm (left): (0)   No drift    Motor arm (right): (0)   No drift    Motor leg (left): (0)   No drift    Motor leg (right): (0)   No drift    Limb ataxia: (0)   Absent    Sensory: (0)   Normal- no sensory loss    Best language: 0    Dysarthria: (0)   Normal    Extinction and inattention: (0)   No abnormality        Total Score:  0       Laboratory Findings    Data     CMP No lab results found in last 7 days.     CBC No lab results found in last 7 days.    INR, PTT No lab results found in last 7 days.     Arterial Blood Gas No lab results found in last 7 days.    UA  No lab results found in last 7 days.    Micro No lab results found in last 7 days.       Radiological Data  Data   No results found for this or any previous visit (from the past 48 hour(s)).       ASSESSMENT AND PLAN    72 year old with hx of DM, HTN, HLD, autoimmune hemolytic anemia [facility]( hb 5.5) and was found to have a Left MCA stroke today. Last known well at 10: 45 AM today. He got IV tPA and was transferred to us for consideration of intervention as there was a question of M2 partial occlusion. NIHSS on arrival here was 0. He does have mild comprehension deficit.    # Neuro:  Stroke- post IV tPA.  ASA 24 hours after IV tPA.  Blood pressure goal < 180/105  Lipid panel pending.  A1C, ECHO pending.  MRI and MRA head and neck pending.  Thiamine for encephalopathy.  Critical left carotid stenosis- discussed with IR about stent.     # CVS:  Continuous telemetry    # Resp:  No issues, satting well.    # GI:   Advancing diet as tolerated.  Pantoprazole.    # Endo:   A1C 8.1  Insulin drip for today.  Continue synthroid.    # Renal: No issues, strict I/O, daily weight.    # Heme: Autoimmune hemolytic anemia. Will consult hematology for management of AHA.    # ID: No issues.    Prophylaxis: no heparin 24 hours of IV tPA. SCD's,  Pantoprazole.  Lines: PIV x 2.  Diet: passed swallow test, advance diet as tolerated.  Full code.       Case discussed with Neuro ICU attending: Dr. Bullard .      Clinton FRANK  Vascular Neurology fellow.  4:03 PM December 8, 2017

## 2017-12-08 NOTE — LETTER
Transition Communication Hand-off for Care Transitions to Next Level of Care Provider    Name: Seven Cat  MRN #: [id number]  Primary Care Provider: Jeff Pinto     Primary Clinic: [facility]   [facility] [id number]     Reason for Hospitalization:  Ischemic left MCA stroke, in utero (H) [P91.0, I63.512]  Admit Date/Time: 12/8/2017  2:23 PM  Discharge Date: 12/15/17  Payor Source: Payor: COMMERCIAL / Plan: [facility] / Product Type: Indemnity /     Plan of Care Goals/Milestone Events:   Patient Concerns: None identified.   Patient Goals:   Short-term: Rehab   Long-term: Return home         Reason for Communication Hand-off Referral: Other Discharge to TCU    Discharge Plan: Patient discharging to [facility] TCU.        Concern for non-adherence with plan of care:   Y/N No  Discharge Needs Assessment:  Needs       Most Recent Value    Equipment Currently Used at Home cane, straight, grab bar, walker, rolling, wheelchair, manual [has step in tub with door]    Transportation Available family or friend will provide        Any outstanding tests or procedures:        Referrals     Future Labs/Procedures    Occupational Therapy Adult Consult     Comments:    Evaluate and treat as clinically indicated.    Reason:  stroke    Physical Therapy Adult Consult     Comments:    Evaluate and treat as clinically indicated.    Reason:  stroke    Speech Language Path Adult Consult     Comments:    Evaluate and treat as clinically indicated.    Reason:  stroke              Delma Castaneda    AVS/Discharge Summary is the source of truth; this is a helpful guide for improved communication of patient story

## 2017-12-09 ENCOUNTER — APPOINTMENT (OUTPATIENT)
Dept: GENERAL RADIOLOGY | Facility: CLINIC | Age: 72
DRG: 034 | End: 2017-12-09
Attending: STUDENT IN AN ORGANIZED HEALTH CARE EDUCATION/TRAINING PROGRAM
Payer: COMMERCIAL

## 2017-12-09 ENCOUNTER — APPOINTMENT (OUTPATIENT)
Dept: OCCUPATIONAL THERAPY | Facility: CLINIC | Age: 72
DRG: 034 | End: 2017-12-09
Attending: NURSE PRACTITIONER
Payer: COMMERCIAL

## 2017-12-09 ENCOUNTER — APPOINTMENT (OUTPATIENT)
Dept: SPEECH THERAPY | Facility: CLINIC | Age: 72
DRG: 034 | End: 2017-12-09
Attending: NURSE PRACTITIONER
Payer: COMMERCIAL

## 2017-12-09 ENCOUNTER — APPOINTMENT (OUTPATIENT)
Dept: CT IMAGING | Facility: CLINIC | Age: 72
DRG: 034 | End: 2017-12-09
Attending: STUDENT IN AN ORGANIZED HEALTH CARE EDUCATION/TRAINING PROGRAM
Payer: COMMERCIAL

## 2017-12-09 ENCOUNTER — APPOINTMENT (OUTPATIENT)
Dept: INTERVENTIONAL RADIOLOGY/VASCULAR | Facility: CLINIC | Age: 72
DRG: 034 | End: 2017-12-09
Attending: PSYCHIATRY & NEUROLOGY
Payer: COMMERCIAL

## 2017-12-09 LAB
AMMONIA PLAS-SCNC: 29 UMOL/L (ref 10–50)
AMMONIA PLAS-SCNC: NORMAL UMOL/L (ref 10–50)
ANION GAP SERPL CALCULATED.3IONS-SCNC: 9 MMOL/L (ref 3–14)
BASE EXCESS BLDV CALC-SCNC: 0.1 MMOL/L
BLOOD BANK CMNT PATIENT-IMP: NORMAL
BUN SERPL-MCNC: 26 MG/DL (ref 7–30)
CALCIUM SERPL-MCNC: 8.9 MG/DL (ref 8.5–10.1)
CHLORIDE SERPL-SCNC: 110 MMOL/L (ref 94–109)
CO2 SERPL-SCNC: 22 MMOL/L (ref 20–32)
CREAT SERPL-MCNC: 1.17 MG/DL (ref 0.66–1.25)
DAT POLY-SP REAG RBC QL: NORMAL
ERYTHROCYTE [DISTWIDTH] IN BLOOD BY AUTOMATED COUNT: 19.9 % (ref 10–15)
GFR SERPL CREATININE-BSD FRML MDRD: 62 ML/MIN/1.7M2
GLUCOSE BLDC GLUCOMTR-MCNC: 147 MG/DL (ref 70–99)
GLUCOSE BLDC GLUCOMTR-MCNC: 157 MG/DL (ref 70–99)
GLUCOSE BLDC GLUCOMTR-MCNC: 183 MG/DL (ref 70–99)
GLUCOSE BLDC GLUCOMTR-MCNC: 184 MG/DL (ref 70–99)
GLUCOSE BLDC GLUCOMTR-MCNC: 225 MG/DL (ref 70–99)
GLUCOSE BLDC GLUCOMTR-MCNC: 226 MG/DL (ref 70–99)
GLUCOSE BLDC GLUCOMTR-MCNC: 309 MG/DL (ref 70–99)
GLUCOSE BLDC GLUCOMTR-MCNC: 341 MG/DL (ref 70–99)
GLUCOSE SERPL-MCNC: 161 MG/DL (ref 70–99)
HCO3 BLDV-SCNC: 25 MMOL/L (ref 21–28)
HCT VFR BLD AUTO: 24.9 % (ref 40–53)
HGB BLD-MCNC: 8.4 G/DL (ref 13.3–17.7)
INR PPP: 1.23 (ref 0.86–1.14)
MCH RBC QN AUTO: 32.4 PG (ref 26.5–33)
MCHC RBC AUTO-ENTMCNC: 33.7 G/DL (ref 31.5–36.5)
MCV RBC AUTO: 96 FL (ref 78–100)
O2/TOTAL GAS SETTING VFR VENT: 21 %
PCO2 BLDV: 42 MM HG (ref 40–50)
PH BLDV: 7.39 PH (ref 7.32–7.43)
PLATELET # BLD AUTO: 229 10E9/L (ref 150–450)
PO2 BLDV: 47 MM HG (ref 25–47)
POTASSIUM SERPL-SCNC: 4.5 MMOL/L (ref 3.4–5.3)
RBC # BLD AUTO: 2.59 10E12/L (ref 4.4–5.9)
SODIUM SERPL-SCNC: 141 MMOL/L (ref 133–144)
WBC # BLD AUTO: 8.6 10E9/L (ref 4–11)

## 2017-12-09 PROCEDURE — 25000132 ZZH RX MED GY IP 250 OP 250 PS 637: Performed by: STUDENT IN AN ORGANIZED HEALTH CARE EDUCATION/TRAINING PROGRAM

## 2017-12-09 PROCEDURE — 99221 1ST HOSP IP/OBS SF/LOW 40: CPT | Mod: GC | Performed by: INTERNAL MEDICINE

## 2017-12-09 PROCEDURE — C1769 GUIDE WIRE: HCPCS

## 2017-12-09 PROCEDURE — C1725 CATH, TRANSLUMIN NON-LASER: HCPCS

## 2017-12-09 PROCEDURE — 25000128 H RX IP 250 OP 636: Performed by: PSYCHIATRY & NEUROLOGY

## 2017-12-09 PROCEDURE — 25000125 ZZHC RX 250: Performed by: STUDENT IN AN ORGANIZED HEALTH CARE EDUCATION/TRAINING PROGRAM

## 2017-12-09 PROCEDURE — 85610 PROTHROMBIN TIME: CPT | Performed by: STUDENT IN AN ORGANIZED HEALTH CARE EDUCATION/TRAINING PROGRAM

## 2017-12-09 PROCEDURE — C1887 CATHETER, GUIDING: HCPCS

## 2017-12-09 PROCEDURE — 71010 XR CHEST PORT 1 VW: CPT

## 2017-12-09 PROCEDURE — 70498 CT ANGIOGRAPHY NECK: CPT

## 2017-12-09 PROCEDURE — 20000004 ZZH R&B ICU UMMC

## 2017-12-09 PROCEDURE — 25000128 H RX IP 250 OP 636: Performed by: STUDENT IN AN ORGANIZED HEALTH CARE EDUCATION/TRAINING PROGRAM

## 2017-12-09 PROCEDURE — 27210845 ZZH DEVICE INFLATION CR5

## 2017-12-09 PROCEDURE — 037L3DZ DILATION OF LEFT INTERNAL CAROTID ARTERY WITH INTRALUMINAL DEVICE, PERCUTANEOUS APPROACH: ICD-10-PCS | Performed by: RADIOLOGY

## 2017-12-09 PROCEDURE — 27210889 ZZH ACCESSORY CR8

## 2017-12-09 PROCEDURE — 83010 ASSAY OF HAPTOGLOBIN QUANT: CPT | Performed by: INTERNAL MEDICINE

## 2017-12-09 PROCEDURE — 27210802 ZZH SHEATH CR1

## 2017-12-09 PROCEDURE — 36415 COLL VENOUS BLD VENIPUNCTURE: CPT | Performed by: STUDENT IN AN ORGANIZED HEALTH CARE EDUCATION/TRAINING PROGRAM

## 2017-12-09 PROCEDURE — 00000146 ZZHCL STATISTIC GLUCOSE BY METER IP

## 2017-12-09 PROCEDURE — 40000133 ZZH STATISTIC OT WARD VISIT: Performed by: OCCUPATIONAL THERAPIST

## 2017-12-09 PROCEDURE — 36415 COLL VENOUS BLD VENIPUNCTURE: CPT | Performed by: INTERNAL MEDICINE

## 2017-12-09 PROCEDURE — 92610 EVALUATE SWALLOWING FUNCTION: CPT | Mod: GN

## 2017-12-09 PROCEDURE — 25000131 ZZH RX MED GY IP 250 OP 636 PS 637: Performed by: STUDENT IN AN ORGANIZED HEALTH CARE EDUCATION/TRAINING PROGRAM

## 2017-12-09 PROCEDURE — 27210887 ZZH ACCESSORY CR6

## 2017-12-09 PROCEDURE — 99153 MOD SED SAME PHYS/QHP EA: CPT

## 2017-12-09 PROCEDURE — 85027 COMPLETE CBC AUTOMATED: CPT | Performed by: STUDENT IN AN ORGANIZED HEALTH CARE EDUCATION/TRAINING PROGRAM

## 2017-12-09 PROCEDURE — 27210738 ZZH ACCESSORY CR2

## 2017-12-09 PROCEDURE — 27210732 ZZH ACCESSORY CR1

## 2017-12-09 PROCEDURE — 25000125 ZZHC RX 250: Performed by: PSYCHIATRY & NEUROLOGY

## 2017-12-09 PROCEDURE — 82803 BLOOD GASES ANY COMBINATION: CPT | Performed by: STUDENT IN AN ORGANIZED HEALTH CARE EDUCATION/TRAINING PROGRAM

## 2017-12-09 PROCEDURE — 37215 TRANSCATH STENT CCA W/EPS: CPT

## 2017-12-09 PROCEDURE — C1876 STENT, NON-COA/NON-COV W/DEL: HCPCS

## 2017-12-09 PROCEDURE — 99152 MOD SED SAME PHYS/QHP 5/>YRS: CPT

## 2017-12-09 PROCEDURE — 27210907 ZZH KIT CR9

## 2017-12-09 PROCEDURE — C2628 CATHETER, OCCLUSION: HCPCS

## 2017-12-09 PROCEDURE — 27211225 ZZH WIRE DEVICE NEURO PROTECTION CR22

## 2017-12-09 PROCEDURE — 27210977 ZZH WIRE DEVICE NEURO PROTECTION CR21

## 2017-12-09 PROCEDURE — 25000128 H RX IP 250 OP 636

## 2017-12-09 PROCEDURE — 97530 THERAPEUTIC ACTIVITIES: CPT | Mod: GO | Performed by: OCCUPATIONAL THERAPIST

## 2017-12-09 PROCEDURE — 97167 OT EVAL HIGH COMPLEX 60 MIN: CPT | Mod: GO | Performed by: OCCUPATIONAL THERAPIST

## 2017-12-09 PROCEDURE — 27210804 ZZH SHEATH CR3

## 2017-12-09 PROCEDURE — C1760 CLOSURE DEV, VASC: HCPCS

## 2017-12-09 PROCEDURE — 25000132 ZZH RX MED GY IP 250 OP 250 PS 637: Performed by: PSYCHIATRY & NEUROLOGY

## 2017-12-09 PROCEDURE — 40000225 ZZH STATISTIC SLP WARD VISIT

## 2017-12-09 PROCEDURE — 25000128 H RX IP 250 OP 636: Performed by: RADIOLOGY

## 2017-12-09 PROCEDURE — 82140 ASSAY OF AMMONIA: CPT | Performed by: STUDENT IN AN ORGANIZED HEALTH CARE EDUCATION/TRAINING PROGRAM

## 2017-12-09 PROCEDURE — 80048 BASIC METABOLIC PNL TOTAL CA: CPT | Performed by: STUDENT IN AN ORGANIZED HEALTH CARE EDUCATION/TRAINING PROGRAM

## 2017-12-09 RX ORDER — LABETALOL HYDROCHLORIDE 5 MG/ML
10 INJECTION, SOLUTION INTRAVENOUS EVERY 10 MIN PRN
Status: DISCONTINUED | OUTPATIENT
Start: 2017-12-09 | End: 2017-12-15 | Stop reason: HOSPADM

## 2017-12-09 RX ORDER — CLOPIDOGREL BISULFATE 75 MG/1
600 TABLET ORAL ONCE
Status: COMPLETED | OUTPATIENT
Start: 2017-12-09 | End: 2017-12-09

## 2017-12-09 RX ORDER — ASPIRIN 325 MG
650 TABLET ORAL ONCE
Status: COMPLETED | OUTPATIENT
Start: 2017-12-09 | End: 2017-12-09

## 2017-12-09 RX ORDER — SODIUM CHLORIDE 9 MG/ML
INJECTION, SOLUTION INTRAVENOUS CONTINUOUS
Status: DISCONTINUED | OUTPATIENT
Start: 2017-12-09 | End: 2017-12-09

## 2017-12-09 RX ORDER — PROCHLORPERAZINE 25 MG
12.5 SUPPOSITORY, RECTAL RECTAL EVERY 12 HOURS PRN
Status: DISCONTINUED | OUTPATIENT
Start: 2017-12-09 | End: 2017-12-15 | Stop reason: HOSPADM

## 2017-12-09 RX ORDER — ONDANSETRON 2 MG/ML
4 INJECTION INTRAMUSCULAR; INTRAVENOUS EVERY 6 HOURS PRN
Status: DISCONTINUED | OUTPATIENT
Start: 2017-12-09 | End: 2017-12-15 | Stop reason: HOSPADM

## 2017-12-09 RX ORDER — ONDANSETRON 4 MG/1
4 TABLET, ORALLY DISINTEGRATING ORAL EVERY 6 HOURS PRN
Status: DISCONTINUED | OUTPATIENT
Start: 2017-12-09 | End: 2017-12-15 | Stop reason: HOSPADM

## 2017-12-09 RX ORDER — FENTANYL CITRATE 50 UG/ML
25-50 INJECTION, SOLUTION INTRAMUSCULAR; INTRAVENOUS EVERY 5 MIN PRN
Status: DISCONTINUED | OUTPATIENT
Start: 2017-12-09 | End: 2017-12-09 | Stop reason: HOSPADM

## 2017-12-09 RX ORDER — ATORVASTATIN CALCIUM 40 MG/1
40 TABLET, FILM COATED ORAL DAILY
Status: DISCONTINUED | OUTPATIENT
Start: 2017-12-09 | End: 2017-12-15 | Stop reason: HOSPADM

## 2017-12-09 RX ORDER — CLOPIDOGREL BISULFATE 75 MG/1
300 TABLET ORAL ONCE
Status: DISCONTINUED | OUTPATIENT
Start: 2017-12-09 | End: 2017-12-09

## 2017-12-09 RX ORDER — ASPIRIN 325 MG
325 TABLET ORAL DAILY
Status: DISCONTINUED | OUTPATIENT
Start: 2017-12-10 | End: 2017-12-15 | Stop reason: HOSPADM

## 2017-12-09 RX ORDER — HEPARIN SODIUM 1000 [USP'U]/ML
6000 INJECTION, SOLUTION INTRAVENOUS; SUBCUTANEOUS ONCE
Status: COMPLETED | OUTPATIENT
Start: 2017-12-09 | End: 2017-12-09

## 2017-12-09 RX ORDER — METOCLOPRAMIDE HYDROCHLORIDE 5 MG/ML
5 INJECTION INTRAMUSCULAR; INTRAVENOUS EVERY 6 HOURS PRN
Status: DISCONTINUED | OUTPATIENT
Start: 2017-12-09 | End: 2017-12-15 | Stop reason: HOSPADM

## 2017-12-09 RX ORDER — NALOXONE HYDROCHLORIDE 0.4 MG/ML
.1-.4 INJECTION, SOLUTION INTRAMUSCULAR; INTRAVENOUS; SUBCUTANEOUS
Status: DISCONTINUED | OUTPATIENT
Start: 2017-12-09 | End: 2017-12-09 | Stop reason: HOSPADM

## 2017-12-09 RX ORDER — HYDRALAZINE HYDROCHLORIDE 20 MG/ML
10 INJECTION INTRAMUSCULAR; INTRAVENOUS EVERY 6 HOURS PRN
Status: DISCONTINUED | OUTPATIENT
Start: 2017-12-09 | End: 2017-12-15 | Stop reason: HOSPADM

## 2017-12-09 RX ORDER — IODIXANOL 320 MG/ML
150 INJECTION, SOLUTION INTRAVASCULAR ONCE
Status: COMPLETED | OUTPATIENT
Start: 2017-12-09 | End: 2017-12-09

## 2017-12-09 RX ORDER — HYDRALAZINE HYDROCHLORIDE 20 MG/ML
10 INJECTION INTRAMUSCULAR; INTRAVENOUS ONCE
Status: COMPLETED | OUTPATIENT
Start: 2017-12-09 | End: 2017-12-09

## 2017-12-09 RX ORDER — PROCHLORPERAZINE MALEATE 5 MG
5 TABLET ORAL EVERY 6 HOURS PRN
Status: DISCONTINUED | OUTPATIENT
Start: 2017-12-09 | End: 2017-12-15 | Stop reason: HOSPADM

## 2017-12-09 RX ORDER — METOCLOPRAMIDE 5 MG/1
5 TABLET ORAL EVERY 6 HOURS PRN
Status: DISCONTINUED | OUTPATIENT
Start: 2017-12-09 | End: 2017-12-15 | Stop reason: HOSPADM

## 2017-12-09 RX ORDER — LABETALOL HYDROCHLORIDE 5 MG/ML
10 INJECTION, SOLUTION INTRAVENOUS ONCE
Status: COMPLETED | OUTPATIENT
Start: 2017-12-09 | End: 2017-12-09

## 2017-12-09 RX ORDER — FLUMAZENIL 0.1 MG/ML
0.2 INJECTION, SOLUTION INTRAVENOUS
Status: DISCONTINUED | OUTPATIENT
Start: 2017-12-09 | End: 2017-12-09 | Stop reason: HOSPADM

## 2017-12-09 RX ORDER — HEPARIN SODIUM 1000 [USP'U]/ML
2000 INJECTION, SOLUTION INTRAVENOUS; SUBCUTANEOUS
Status: COMPLETED | OUTPATIENT
Start: 2017-12-09 | End: 2017-12-09

## 2017-12-09 RX ORDER — HEPARIN SODIUM 1000 [USP'U]/ML
2000 INJECTION, SOLUTION INTRAVENOUS; SUBCUTANEOUS ONCE
Status: COMPLETED | OUTPATIENT
Start: 2017-12-09 | End: 2017-12-09

## 2017-12-09 RX ORDER — CLOPIDOGREL BISULFATE 75 MG/1
300 TABLET ORAL DAILY
Status: DISCONTINUED | OUTPATIENT
Start: 2017-12-09 | End: 2017-12-09

## 2017-12-09 RX ORDER — ASPIRIN 325 MG
650 TABLET ORAL DAILY
Status: DISCONTINUED | OUTPATIENT
Start: 2017-12-09 | End: 2017-12-09

## 2017-12-09 RX ORDER — IOPAMIDOL 755 MG/ML
75 INJECTION, SOLUTION INTRAVASCULAR ONCE
Status: COMPLETED | OUTPATIENT
Start: 2017-12-09 | End: 2017-12-09

## 2017-12-09 RX ADMIN — MIDAZOLAM 4 MG: 1 INJECTION INTRAMUSCULAR; INTRAVENOUS at 18:19

## 2017-12-09 RX ADMIN — DEXMEDETOMIDINE HYDROCHLORIDE 0.7 MCG/KG/HR: 4 INJECTION, SOLUTION INTRAVENOUS at 01:12

## 2017-12-09 RX ADMIN — FENTANYL CITRATE 200 MCG: 50 INJECTION INTRAMUSCULAR; INTRAVENOUS at 18:20

## 2017-12-09 RX ADMIN — Medication 10 MG: at 18:15

## 2017-12-09 RX ADMIN — NICARDIPINE HYDROCHLORIDE 5 MG/HR: 2.5 INJECTION INTRAVENOUS at 20:33

## 2017-12-09 RX ADMIN — HYDRALAZINE HYDROCHLORIDE 10 MG: 20 INJECTION INTRAMUSCULAR; INTRAVENOUS at 16:40

## 2017-12-09 RX ADMIN — ATORVASTATIN CALCIUM 40 MG: 40 TABLET, FILM COATED ORAL at 20:25

## 2017-12-09 RX ADMIN — Medication 5000 UNITS: at 18:21

## 2017-12-09 RX ADMIN — INSULIN ASPART 2 UNITS: 100 INJECTION, SOLUTION INTRAVENOUS; SUBCUTANEOUS at 20:38

## 2017-12-09 RX ADMIN — INSULIN ASPART 1 UNITS: 100 INJECTION, SOLUTION INTRAVENOUS; SUBCUTANEOUS at 08:12

## 2017-12-09 RX ADMIN — NICARDIPINE HYDROCHLORIDE 5 MG/HR: 0.2 INJECTION, SOLUTION INTRAVENOUS at 17:10

## 2017-12-09 RX ADMIN — INSULIN ASPART 4 UNITS: 100 INJECTION, SOLUTION INTRAVENOUS; SUBCUTANEOUS at 23:47

## 2017-12-09 RX ADMIN — INSULIN ASPART 9 UNITS: 100 INJECTION, SOLUTION INTRAVENOUS; SUBCUTANEOUS at 00:35

## 2017-12-09 RX ADMIN — IODIXANOL 100 ML: 320 INJECTION, SOLUTION INTRAVASCULAR at 18:40

## 2017-12-09 RX ADMIN — HEPARIN SODIUM 2000 UNITS: 1000 INJECTION, SOLUTION INTRAVENOUS; SUBCUTANEOUS at 17:44

## 2017-12-09 RX ADMIN — THIAMINE HYDROCHLORIDE 300 MG: 100 INJECTION, SOLUTION INTRAMUSCULAR; INTRAVENOUS at 20:36

## 2017-12-09 RX ADMIN — SODIUM CHLORIDE: 9 INJECTION, SOLUTION INTRAVENOUS at 19:01

## 2017-12-09 RX ADMIN — PREDNISONE 60 MG: 50 TABLET ORAL at 20:25

## 2017-12-09 RX ADMIN — INSULIN ASPART 4 UNITS: 100 INJECTION, SOLUTION INTRAVENOUS; SUBCUTANEOUS at 04:03

## 2017-12-09 RX ADMIN — INSULIN ASPART 1 UNITS: 100 INJECTION, SOLUTION INTRAVENOUS; SUBCUTANEOUS at 12:35

## 2017-12-09 RX ADMIN — HEPARIN SODIUM 6000 UNITS: 1000 INJECTION, SOLUTION INTRAVENOUS; SUBCUTANEOUS at 16:45

## 2017-12-09 RX ADMIN — THIAMINE HYDROCHLORIDE 300 MG: 100 INJECTION, SOLUTION INTRAMUSCULAR; INTRAVENOUS at 14:07

## 2017-12-09 RX ADMIN — CLOPIDOGREL 600 MG: 75 TABLET, FILM COATED ORAL at 12:58

## 2017-12-09 RX ADMIN — HEPARIN SODIUM 2000 UNITS: 1000 INJECTION, SOLUTION INTRAVENOUS; SUBCUTANEOUS at 16:05

## 2017-12-09 RX ADMIN — ASPIRIN 325 MG ORAL TABLET 650 MG: 325 PILL ORAL at 12:52

## 2017-12-09 RX ADMIN — IOPAMIDOL 75 ML: 755 INJECTION, SOLUTION INTRAVENOUS at 07:46

## 2017-12-09 RX ADMIN — LIDOCAINE HYDROCHLORIDE 8 ML: 10 INJECTION, SOLUTION EPIDURAL; INFILTRATION; INTRACAUDAL; PERINEURAL at 18:21

## 2017-12-09 RX ADMIN — SODIUM CHLORIDE: 9 INJECTION, SOLUTION INTRAVENOUS at 20:03

## 2017-12-09 ASSESSMENT — VISUAL ACUITY
OU: GLASSES

## 2017-12-09 ASSESSMENT — ACTIVITIES OF DAILY LIVING (ADL)
DRESS: 0-->INDEPENDENT
RETIRED_EATING: 0-->INDEPENDENT
TOILETING: 0-->INDEPENDENT
BATHING: 0-->INDEPENDENT
TRANSFERRING: 0-->INDEPENDENT
SWALLOWING: 0-->SWALLOWS FOODS/LIQUIDS WITHOUT DIFFICULTY
AMBULATION: 0-->INDEPENDENT
COGNITION: 0 - NO COGNITION ISSUES REPORTED
RETIRED_COMMUNICATION: 0-->UNDERSTANDS/COMMUNICATES WITHOUT DIFFICULTY

## 2017-12-09 NOTE — PROVIDER NOTIFICATION
NICU notified regarding non-compliance with assessments and cares; plan to start intervention at 0000.

## 2017-12-09 NOTE — PROGRESS NOTES
12/09/17 1152   General Information   Onset Date 12/08/17   Start of Care Date 12/09/17   Referring Physician Misty Mercedes APRN CNP   Patient Profile Review/OT: Additional Occupational Profile Info See Profile for full history and prior level of function   Patient/Family Goals Statement Pt unable to state, but appears to want to eat   Swallowing Evaluation Bedside swallow evaluation   Behaviorial Observations Confused  (aphasic; answers yes/no questions consistently)   Mode of current nutrition Oral diet   Type of oral diet Regular;Thin liquid  (RN has kept NPO)   Respiratory Status Room air   Comments Orders received for swallow evaluation. Pt with PMH as above [facility] with L MCA syndrome. Last known normal time was 10:45 AM. He was found to be confused and dysarthric by the [facility] tech at [facility] who was checking the glucose. On exam, he had right sided weakness, dysarthria, right facial droop. NIHSS was 4. Blood glucose was 400.    Clinical Swallow Evaluation   Oral Musculature generally intact  (some difficulty following commands)   Structural Abnormalities none present   Dentition upper and lower dentures  (appear ill fitting)   Laryngeal Function Voicing initiated;Swallow   Additional Documentation Yes   Swallow Eval   Feeding Assistance frequent cues/help required   Clinical Swallow Eval: Thin Liquid Texture Trial   Mode of Presentation, Thin Liquids spoon;fed by clinician   Volume of Liquid or Food Presented ice chips x3   Oral Phase of Swallow (poor oral awareness and bolus control)   Pharyngeal Phase of Swallow impaired;throat clearing;wet vocal quality after swallow;coughing/choking   Diagnostic Statement elevated aspiration risk with small ice chip trials   Clinical Swallow Eval: Nectar Thick Liquid Texture Trial   Mode of Presentation, Nectar cup;spoon;straw;fed by clinician   Volume of Nectar Presented 4oz   Oral Phase, Nectar WFL   Pharyngeal Phase, Nectar  intact  (no overt s/sx of aspiration observed)   Diagnostic Statement swallow functional for nectar-thick liquids   Clinical Swallow Eval: Puree Solid Texture Trial   Mode of Presentation, Puree spoon;fed by clinician   Volume of Puree Presented tsp bites x4   Oral Phase, Puree WFL   Pharyngeal Phase, Puree intact  (no overt s/sx of aspiration observed)   Diagnostic Statement swallow appears functional for puree textures   Clinical Swallow Eval: Semisolid Texture Trial   Mode of Presentation, Semisolid fed by clinician;spoon   Volume of Semisolid Food Presented bites x2; Pt declined further PO trials   Oral Phase, Semisolid WFL  (mastication/bolus manipulation adequate)   Oral Residue, Semisolid (none)   Pharyngeal Phase, Semisolid intact  (no overt s/sx of aspiration observed)   Diagnostic Statement swallow appears functional for soft solids   Swallow Compensations   Swallow Compensations Pacing;Reduce amounts;Alternate viscosity of consistencies  (implemented by clinician)   Esophageal Phase of Swallow   Patient reports or presents with symptoms of esophageal dysphagia No   General Therapy Interventions   Planned Therapy Interventions Dysphagia Treatment   Dysphagia treatment Modified diet education;Instruction of safe swallow strategies   Swallow Eval: Clinical Impressions   Skilled Criteria for Therapy Intervention Skilled criteria met.  Treatment indicated.   Functional Assessment Scale (FAS) 4   Treatment Diagnosis mild-moderate dysphagia   Diet texture recommendations Dysphagia diet level 2;Nectar thick liquids   Recommended Feeding/Eating Techniques alternate between small bites and sips of food/liquid;maintain upright posture during/after eating for 30 mins;small sips/bites   Demonstrates Need for Referral to Another Service occupational therapy;physical therapy   Therapy Frequency daily   Predicted Duration of Therapy Intervention (days/wks) 2 weeks   Anticipated Discharge Disposition inpatient  rehabilitation facility   Risks and Benefits of Treatment have been explained. Yes   Patient, family and/or staff in agreement with Plan of Care Yes   Clinical Impression Comments Clinical swallow evaluation completed per MD order. Pt presents with mild-moderate oral-pharyngeal dysphagia on exam. Elevated aspiration risk observed with ice chip trials; Pt tolerated nectar-thick liquids, purees, and semi-solids without overt s/sx of airway compromise. Recommend initiate dysphagia diet level 2 with nectar-thick liquids as tolerated. Pt to sit upright for all PO intake, take small bites/sips and alternate consistencies. Anticipate need for RN assist/cues for implementation of strategies. Pt is able to answer yes/no questions consistently, but demonstrates difficulty with verbal expression and following some commands on informal observation. Will need formal speech/language evaluation, however, Pt declining to participate at this time   Total Evaluation Time   Total Evaluation Time (Minutes) 17

## 2017-12-09 NOTE — PLAN OF CARE
Problem: Patient Care Overview  Goal: Plan of Care/Patient Progress Review  Discharge Planner SLP   Patient plan for discharge: unknown  Current status: Clinical swallow evaluation completed per MD order. Pt presents with mild-moderate oral-pharyngeal dysphagia on exam. Elevated aspiration risk observed with ice chip trials; Pt tolerated nectar-thick liquids, purees, and semi-solids without overt s/sx of airway compromise. Recommend initiate dysphagia diet level 2 with nectar-thick liquids as tolerated. Pt to sit upright for all PO intake, take small bites/sips and alternate consistencies. Anticipate need for RN assist/cues for implementation of strategies. Pt is able to answer yes/no questions consistently, but demonstrates difficulty with verbal expression and following some commands on informal observation. Will need formal speech/language evaluation, however, Pt declining to participate at this time  Barriers to return to prior living situation: dysphagia, aphasia  Recommendations for discharge: inpatient rehab  Rationale for recommendations: ongoing therapy needs       Entered by: Andree Peoples 12/09/2017 12:01 PM

## 2017-12-09 NOTE — PROGRESS NOTES
[facility]  NeuroICU Daily ICU Note:          Assessment   Seven Cat is a 72 year old male with signs of LT MCA stroke s/p TPA day 1 with chronic Critical More than 90% LT  ICA stenosis.          Subjective (24 hours events)   Over night the patient became agitated & after improving from TPA he started to experience Expresive aphasia, started on precedex got CT & CTA in the MA showing New focal infarct of the inferior left frontal lobe which likely would likely involve Broca's area and correlate with patient's symptomatology. NIHSS went from 0 to 4         Plan 1.   Neuro: LT MCA distribution stroke with LT ICA stenosis    Continue frequent neuro exams while in ICU. Notify MD for acute changes in exam.    Load with ASA & Plavix 24 hr after TPA & continue on ASA & plavix    The patient will get diagnostic cerebral angiogram with plan of stenting of LT ICA    Start atorvastatin 40 mg    Thiamine   2. CVS: HTN & HLD    heamodynamically stable    ECG sinus rhythm with normal ECG    Echo : EF 55-60% No PFO, Normal atria    Maintain SBP < 180    Hydralazine and labetolol PRN    Continuous cardiac monitoring while in ICU  3. Pulmonary: no issues    Continuous pulse oximetry    Supplemental oxygen PRN    Incentive spirometry Q1H while awake    Daily chest X ray as needed   4. GI:     NPO for procedure     Evaluated by SLP who recommend dysphagia diet level 2 with nectar thick liquids.    Bowel regimen. PRN anti-emetics.    Protonix for ulcer ppx  5. Renal: no issues    NS 75 ml/hr    Electrolyte replacement protocol    Continue to monitor intake/output    Daily BMP    History of BPH  6. ID: afebrile, normal WBC count    Continue to monitor for fevers and/or signs of infection  7. Endocrine: DM with neuropathy & retinopathy    Continue glucose checks    Continue sliding scale insulin    History of Hashimoto's thyroididits coninue levothyroxine 175 mcq before breakfast   8. Heme: Autoimmune  Heamolytic aneamia     Consult haematology & appreciate their recs>> start prednisolone 60 mg BID    Platelets > 100,000    INR < 1.5    Hemoglobin > 7    Daily CBC  9. Prophylaxis    DVT: SCDs while in bed    GI: Protonix  10. Disposition: Neuro ICU    PT/OT    Above patient and plan has been discussed with the neuro ICU Attending.                Objective   Temp:  [98  F (36.7  C)-98.5  F (36.9  C)] 98  F (36.7  C)  Heart Rate:  [54-91] 87  Resp:  [8-30] 19  BP: (103-140)/(40-92) 133/71  SpO2:  [93 %-99 %] 99 %    No Data Recorded    Resp: 19    I/O last 3 completed shifts:  In: 1219.45 [P.O.:480; I.V.:739.45]  Out: 850 [Urine:850]    Physical Exam  General: NAD, lying comfortably in bed  Neurologic    Mental Status:       -- Awake; Alert; oriented x 3      -- Follows commands       -- Patient has expressive aphasia .      -- no gaze preference. No apparent hemineglect.    Cranial Nerves:      -- visual fields full to confrontation, PERRL 3-2mm bilat and brisk      -- extraocular movements intact      -- face symmetrical, tongue midline      -- sensory V1-V3 intact bilaterally      -- palate elevates symmetrically, uvula midline      -- hearing grossly intact bilat    Motor:    Delt Bi Tri WE WF    R 5 5 5 5 5 5   L 5 5 5 5 5 5    IP Quad Ham DF PF EHL   R 5 5 5 5 5 5   L 5 5 5 5 5 5     Sensory: symmetrically intact to light touch x4 extremities.     Reflexes: 2+ bilaterally and symmetric in biceps, triceps, brachioradialis, and patellae; no ankle clonus bilaterally; negative Babinski bilaterally; negative Jalloh's bilaterally      Labs and Imaging   BMP  Recent Labs  Lab 12/09/17  0641 12/08/17  1545    136   POTASSIUM 4.5 4.6   CHLORIDE 110* 106   CO2 22 21   BUN 26 26   CR 1.17 1.13   IZABELLA 8.9 8.8       CBC  Recent Labs  Lab 12/09/17  0641 12/08/17  1545   WBC 8.6 8.8   HGB 8.4* 7.8*    237       COAGS  Recent Labs  Lab 12/09/17  0641 12/08/17  1545   INR 1.23* 1.25*   PTT  --  29   FIBR  --   364       ABGNo results for input(s): PH, PCO2, PO2, HCO3 in the last 168 hours.    CRP/ESRNo results for input(s): CRP in the last 168 hours.    Invalid input(s): ESR    CSFNo results for input(s): CGLU, CTP in the last 168 hours.    Invalid input(s): CCSF    MICRONo results for input(s): CULT in the last 168 hours.    IMAGING:   reviewed

## 2017-12-09 NOTE — PROGRESS NOTES
12/09/17 1135   Quick Adds   Type of Visit Initial Occupational Therapy Evaluation   Living Environment   Lives With spouse   Living Arrangements house  (4 level split)   Home Accessibility ramps present at home;stairs within home   Transportation Available car;family or friend will provide   Living Environment Comment Pt's spouse provided much of the eval information due to pt's severe expressive aphasia; pt's spouse reports they have a sidewalk with ramp to enter home on lower level from patio - this lower level is where they have their main bed/bath and a family room; kitchen/dining room are up 7 stairs; bath on lower level has a walk in tub   Self-Care   Dominant Hand right   Usual Activity Tolerance moderate   Current Activity Tolerance fair   Regular Exercise no   Equipment Currently Used at Home grab bar;other (see comments)  (built in tub bench, owns w/c, walker, cane, slide board)   Activity/Exercise/Self-Care Comment Back in 2010 pt had to use w/c due to severe BLE neuropathy; worked his way back to being (I) with all functional mobility without AD though cannot walk long distances   Functional Level Prior   Ambulation 0-->independent   Transferring 0-->independent   Toileting 1-->assistive equipment   Bathing 1-->assistive equipment   Dressing 0-->independent   Eating 0-->independent   Communication 0-->understands/communicates without difficulty   Swallowing 0-->swallows foods/liquids without difficulty   Cognition 0 - no cognition issues reported   Fall history within last six months no   Which of the above functional risks had a recent onset or change? ambulation;transferring;toileting;bathing;dressing;eating;swallowing;cognition;communication/speech   Prior Functional Level Comment Pt was (I) with all ADL/IADLs including driving; pt is retired    General Information   Onset of Illness/Injury or Date of Surgery - Date 12/08/17   Referring Physician Misty Mercedes APRN CNP   Patient/Family  "Goals Statement unable to state   Additional Occupational Profile Info/Pertinent History of Current Problem Pt is a 72 year old male with past medical history of hemolytic anemia, hypertension, diabetes, hyperlipidemia recent blood transfusion for hemolysis presented from [facility] after receiving tPA for Lt MCA territory related stroke. He was noted to have Left internal carotid stenosis which is likely the underlying cause of stroke. He did have mild worsening of symptoms overnight and new CT/CTA showed left frontal opercular stroke in addition to posterior MCA PCA border zone area stroke   Precautions/Limitations fall precautions   Weight-Bearing Status - LUE full weight-bearing   Weight-Bearing Status - RUE full weight-bearing   Weight-Bearing Status - LLE full weight-bearing   Weight-Bearing Status - RLE full weight-bearing   Cognitive Status Examination   Level of Consciousness alert;agitated   Able to Follow Commands success, 1-step commands  (75% of the time)   Personal Safety (Cognitive) impulsive;decreased awareness, need for assist;decreased insight to deficits   Cognitive Comment Exam limited by severe expressive aphasia; pt also frequently agitated and appeared to refuse following some commands   Visual Perception   Visual Perception Wears glasses   Visual Acuity Pt reports WFL   Visual Field Pt reports WFL; indicated seeing objects in periphery however had difficult fully following commands for assessment   Visual Attention Pt appears to have mild inattention to R; to be further assessed as able   Occulomotor Pt with slowed tracking to R   Visual Perception Comments Pt indicates \"yes\" when asked if his vision has changed or is different however responds \"no\" when asked if having increased blurriness, diplopia, or field cut questions; exam limited by pt's limited ability/desire to follow commands/give full effort   Sensory Examination   Sensory Quick Adds Other (describe)   Sensory Comments Pt's " spouse reports he had severe BLE neuropathy back in 2010 which limited pt to a w/c for mobility; pt has since regained ability to walk without AD; pt denies sensory changes though exam limited by pt's expressive aphasia   Pain Assessment   Patient Currently in Pain No   Range of Motion (ROM)   ROM Comment BUEs WFL   Strength   Strength Comments BUEs WFL except R hand which is significantly weaker in comparison to L   Hand Strength   Hand Strength Comments Pt with R  weakness   Coordination   Coordination Comments Pt able to complete thumb-to-finger opposition on B hands with similar speed - denies difficulty with task; able to feed self using R hand without issue; able to perform finger-to-nose using BUEs and no significant difficulty/dysmetria   Mobility   Bed Mobility Bed mobility skill: Rolling/Turning;Bed mobility skill: Scooting/Bridging;Bed mobility skill: Sit to supine;Bed mobility skill: Supine to sit   Bed Mobility Skill: Rolling/Turning   Level of [place] - Bed Mobility Skill Rolling Turning minimum assist (75% patients effort)   Physical Assist/Nonphysical Assist verbal cues;1 person assist   Bed Mobility Skill: Scooting/Bridging   Level of [place]: Scooting/Bridging minimum assist (75% patients effort)   Physical Assist/Nonphysical Assist: Scooting/Bridging verbal cues;1 person assist   Bed Mobility Skill: Sit to Supine   Level of [place]: Sit/Supine minimum assist (75% patients effort)   Physical Assist/Nonphysical Assist: Sit/Supine verbal cues;1 person assist   Bed Mobility Skill: Supine to Sit   Level of [place]: Supine/Sit minimum assist (75% patients effort)   Physical Assist/Nonphysical Assist: Supine/Sit verbal cues;1 person assist   Transfer Skill: Bed to Chair/Chair to Bed   Level of [place]: Bed to Chair minimum assist (75% patients effort)   Physical Assist/Nonphysical Assist: Bed to Chair verbal cues;1 person + 1 person to manage equipment   Transfer Skill: Sit  "to Stand   Level of [place]: Sit/Stand minimum assist (75% patients effort)   Physical Assist/Nonphysical Assist: Sit/Stand verbal cues;1 person + 1 person to manage equipment   Activities of Daily Living Analysis   Impairments Contributing to Impaired Activities of Daily Living balance impaired;cognition impaired;motor control impaired;pain;strength decreased;fear and anxiety   General Therapy Interventions   Planned Therapy Interventions ADL retraining;IADL retraining;bed mobility training;cognition;motor coordination training;neuromuscular re-education;ROM;strengthening;orthotic fitting/training;stretching;transfer training;visual perception;home program guidelines;progressive activity/exercise   Clinical Impression   Criteria for Skilled Therapeutic Interventions Met yes, treatment indicated   OT Diagnosis decreased ADL/IADL (I)   Influenced by the following impairments weakness, aphasia, impaired sensory feedback, balance deficits, cognitive impairement   Assessment of Occupational Performance 5 or more Performance Deficits   Identified Performance Deficits functional transfers, toileting, bathing, dressing, household tasks, medical and financial management, driving   Clinical Decision Making (Complexity) High complexity   Therapy Frequency daily   Predicted Duration of Therapy Intervention (days/wks) 7-10 days   Anticipated Discharge Disposition Home with Home Therapy;Home with Outpatient Therapy;Transitional Care Facility;Acute Rehabilitation Facility   Risks and Benefits of Treatment have been explained. Yes   Patient, Family & other staff in agreement with plan of care Yes   [facility] [employer]-PAC TM \"6 Clicks\"   2016, Trustees of [facility], under license to [employer].  All rights reserved.   6 Clicks Short Forms Daily Activity Inpatient Short Form   [facility] AM-PAC  \"6 Clicks\" Daily Activity Inpatient Short Form   1. Putting on and taking off regular lower body clothing? 2 - A " Lot   2. Bathing (including washing, rinsing, drying)? 2 - A Lot   3. Toileting, which includes using toilet, bedpan or urinal? 2 - A Lot   4. Putting on and taking off regular upper body clothing? 2 - A Lot   5. Taking care of personal grooming such as brushing teeth? 3 - A Little   6. Eating meals? 3 - A Little   Daily Activity Raw Score (Score out of 24.Lower scores equate to lower levels of function) 14   Total Evaluation Time   Total Evaluation Time (Minutes) 24

## 2017-12-09 NOTE — PROGRESS NOTES
D/I: Pt AAOx4. PERRLA. Strength strong and equal in all extremities. Mild aphasia and short term memory loss noted. Denies pain and numbness/tingling. SR, SBPs 110-120s. Sats high 90s on RA. Urinal voiding. No BM. IV infiltrate L arm noted on admission, no other skin issues.  A/P: Serial VS and neuro checks. MRI pending.

## 2017-12-09 NOTE — PLAN OF CARE
Problem: Patient Care Overview  Goal: Plan of Care/Patient Progress Review  Discharge Planner OT   Patient plan for discharge: Pt unable to state due to expressive aphasia; has indicated strong desire to go home per chart - on 72hr hold  Current status: OT eval completed and tx initiated. Pt presents with severe expressive aphasia - limited to primarily yes/no questions; pt unable to express needs/questions coherently despite several attempts (both verbally and written). Pt does appear able to understand most speech and follows simple commands when he chooses (pt quite agitated and refusing some activities/requests during session). Pt moving quite well - min A for bed mobility and transfer to/from chair in room. Pt does display significant impulsivity with activity and needs cues for safety. Pt also noted to have mild weakness in R/dominant hand though able to perform self feeding without issue during session.  Barriers to return to prior living situation: weakness, impaired cognition, balance deficits  Recommendations for discharge: ARU  Rationale for recommendations: to increase pt's safety and (I) with ADL/IADLs       Entered by: Blanca Villar 12/09/2017 4:17 PM

## 2017-12-09 NOTE — PLAN OF CARE
"Problem: Stroke (Ischemic) (Adult)  Goal: Signs and Symptoms of Listed Potential Problems Will be Absent, Minimized or Managed (Stroke)  Signs and symptoms of listed potential problems will be absent, minimized or managed by discharge/transition of care (reference Stroke (Ischemic) (Adult) CPG).   Outcome:   [facility].. S/p TPA infusion on 12/8/17 at 1146am then transferred to [facility]    Neuro- On Precedex during shift for agitation and threatening behavior. PERRL. 2mm-3mm. Arouses to voice and gentle touch. Follows commands. Moves all extremities independently. Did not responded to questions this shift. NCC aware. Started weaning Precedex at 0500.   CV- Afebrile. HR 50s-80s. No ectopy. BP stable.  Resp- Lungs clear on room air. No shortness of breath noted but does appear to use abdominal muscles to breath. VBG WNL  GI- Tolerated diet. Abdomen obese. +bowel sounds.  - Voids to urinal. Had one episode of incontinence this morning.     Plan for Q1 hr neuro checks until 1100am. Neuro crit managing patient     At 0550-- patient noted to be more awake but unable to say anything but \"yes\" \"no\" \"what\" and \"wait\". Precedex was turned off at 0600. MD Flores notified and came to assess patient. Labs were drawn and CT scan was ordered. Patient is able to appropriately respond to yes and no questions and attempts to write his needs.      "

## 2017-12-09 NOTE — PLAN OF CARE
Problem: Patient Care Overview  Goal: Plan of Care/Patient Progress Review  PT 4A: PT orders received. Pt with SLP and OT this morning, plans for IR this afternoon for procedure, will reschedule.

## 2017-12-09 NOTE — PLAN OF CARE
"Problem: Patient Care Overview  Goal: Plan of Care/Patient Progress Review  Outcome: No Change  At 1945 Patient was very agitated, refusing cares, and insisting on leaving the hospital. Writer attempted to explain cares to patient and wife as well as the reason he was admitted to to ICU -no evidence that patient was comprehending the situation. Patient continued to become increasingly agitated insisting on \"answers\", \"action\", and wanting his medications from home or he was \"going to leave\". Brenda Flores MD came to see the patient and wife to explain coarse of stay and plan of care. Code 21 was called around 2020. Patient attempted to leave, was physically and verbally aggressive with staff and wife, insisted he was leaving. Patient eventually listened to security and another male RN, able to convince patient to lay back in bed and RN was able to start IV Precedex to help patient relax. Wife at bedside throughout situation until around 2130; wife was updated on current plan of care.        "

## 2017-12-09 NOTE — PROGRESS NOTES
"Cross cover note    I was called to the patient's bedside to answer questions regarding MRI and treatment plan. I showed and explained MRI results to patient and wife, clearly stating that he had suffered stroke due to narrowing of carotid artery that could potentially worsen, therefore a decision about surgical versus medical management of the narrowing would be made in the coming days as he was monitored and further data collected. He demanded an \"answer\" with regard to what treatment would be offered at that moment and I told him that this was not a decision that would be made at 8 pm on a Friday as it was not currently a medical emergency, but his hospitalization was because there is high risk of a medical emergency (including intracranial hemorrhage, irreversible paralysis, or death) occurring if not hospitalized.     I was called to a medical emergency for another patient and excused myself from the room after these points had been covered.     I heard overhead page of code 21 to this patient's room and as the medical emergency was resolved, I returned to the patient's room. He had reportedly swung at a nurse and was threatening to leave. He was unable to state to me why he was hospitalized in this facility, what risks he would impose on himself if he were to leave, and what potential outcomes he would suffer should he have clinical deterioration in the community. He said that he would get home by walking barefoot and in an open hospital gown (it was currently 30 degrees and snowing.) He gave the fact that he had insulin at home already as a reason why he could receive appropriate care for his current condition at his home. He had also received thrombolytics earlier in the day and was at high risk of hemorrhage with preexisting hemolytic anemia. For these reasons, and after discussion with fellow, Dr. Stone, and his discussion with attending, Dr. Bullard, it was determined that a 72 hour medical hold would be " placed.     The patient was informed that he would not be leaving the hospital this evening. He said that this was not the case and threatened physical harm against me and against his wife. He was informed that he needed to lay calmly in bed and that physical restraints would be used if necessary, but would not be necessary if he were to cooperate. A precedex infusion with goal RASS 0 to -1 was initiated and the patient was agreeable to receive this treatment through the IV and physical restraints were not applied at that time.    Epic order for 72 hour hold placed at 2126 and paperwork supplied by ED was filled out.    Brenda Flores MD  Neurology PGY-2  [phone]

## 2017-12-09 NOTE — H&P
[facility], [place]     Endovascular Surgical Neuroradiology Pre-Procedure Note      HPI:  Seven Cat is a 72 year old male with past medical history of hemolytic anemia, hypertension, diabetes, hyperlipidemia recent blood transfusion for hemolysis presented from [facility] after receiving tPA for Lt MCA territory related stroke. He was noted to have Left internal carotid stenosis which is likely the underlying cause of stroke. He did have mild worsening of symptoms overnight and new CT/CTA showed left frontal opercular stroke in addition to posterior MCA PCA border zone area stroke.    Medical History:  No past medical history on file.    Surgical History:  No past surgical history on file.    Family History:  No family history on file.    Social History:  Social History     Social History     Marital status: N/A     Spouse name: N/A     Number of children: N/A     Years of education: N/A     Occupational History     Not on file.     Social History Main Topics     Smoking status: Not on file     Smokeless tobacco: Not on file     Alcohol use Not on file     Drug use: Not on file     Sexual activity: Not on file     Other Topics Concern     Not on file     Social History Narrative       Allergies:  Not on File    Is there a contrast allergy?  No    Medications:  Current Facility-Administered Medications   Medication     atorvastatin (LIPITOR) tablet 40 mg     aspirin tablet 325 mg     clopidogrel (PLAVIX) tablet 300 mg     midazolam (VERSED) injection 0.5-1 mg     flumazenil (ROMAZICON) injection 0.2 mg     lidocaine 1 % 1-30 mL     fentaNYL (PF) (SUBLIMAZE) injection 25-50 mcg     naloxone (NARCAN) injection 0.1-0.4 mg     Medication Instruction - NO anti-coagulation or anti-platelet meds for 24 hours after end of IV alteplase (ACTIVASE) INFUSION [Including: heparin, enoxaparin (LOVENOX), warfarin (COUMADIN), aspirin, NSAIDs, clopidogrel (PLAVIX), dipyridamole  (PERSANTINE)].     Medication Instruction: For Pharmacy Assistance Regarding ateplase (ACTIVASE) Administration: [facility]  Page: [phone] - 24 hours per day OR [facility] Page: [phone]     naloxone (NARCAN) injection 0.1-0.4 mg     lidocaine 1 % 0.5-5 mL     lidocaine (LMX4) kit     0.9% sodium chloride infusion     labetalol (NORMODYNE/TRANDATE) injection 10 mg     pantoprazole (PROTONIX) 40 mg IV push injection     ondansetron (ZOFRAN-ODT) ODT tab 4 mg    Or     ondansetron (ZOFRAN) injection 4 mg     potassium chloride SA (K-DUR/KLOR-CON M) CR tablet 20-40 mEq     potassium chloride (KLOR-CON) Packet 20-40 mEq     potassium chloride 10 mEq in 100 mL sterile water intermittent infusion (premix)     potassium chloride 10 mEq in 100 mL intermittent infusion with 10 mg lidocaine     magnesium sulfate 4 g in 100 mL sterile water (premade)     potassium phosphate 15 mmol in NaCl 0.9 % 250 mL intermittent infusion     potassium phosphate 20 mmol in NaCl 0.9 % 500 mL intermittent infusion     potassium phosphate 20 mmol in NaCl 0.9 % 250 mL intermittent infusion     potassium phosphate 25 mmol in NaCl 0.9 % 500 mL intermittent infusion     lidocaine 1 % 1 mL     lidocaine (LMX4) kit     sodium chloride (PF) 0.9% PF flush 3 mL     sodium chloride (PF) 0.9% PF flush 3 mL     glucose 40 % gel 15-30 g    Or     dextrose 50 % injection 25-50 mL    Or     glucagon injection 1 mg     levothyroxine (SYNTHROID/LEVOTHROID) tablet 175 mcg     thiamine (B-1) 300 mg in NaCl 0.9 % 50 mL intermittent infusion     hydrALAZINE (APRESOLINE) injection 10 mg     dexmedetomidine (PRECEDEX) 400 mcg in 0.9% sodium chloride 100 mL     insulin aspart (NovoLOG) inj (RAPID ACTING)   .    ROS:  The 10 point Review of Systems is negative other than noted in the HPI or here.     PHYSICAL EXAMINATION  Vital Signs:  B/P: 142/75,  T: 98,  P: Data Unavailable,  R: 16    Cardio:  RRR  Pulmonary:  no respiratory distress  Abdomen:   soft    Neurologic  MENTAL STATUS:  Alert. Severe expressive aphasia, follows command.   CRANIAL NERVES: Pupils are equal, round, reactive to light, subtle right facial droop.  Extraocular movements full.  Visual fields full.  Facial sensation, movement normal.  Palate moves symmetrically.  Tongue midline.  Sternocleidomastoid and trapezius strength intact.  Neck strength was normal.  No bruits.   NEUROLOGIC:  Motor 5/5.  Reflexes 2/4.  Toe signs downgoing.  Good finger-nose-finger, fine finger movement, heel-shin maneuver, sensation to light touch, position sense and double simultaneous stimulation.  Pre-procedure National Institutes of Health Stroke Scale:      Score    Level of consciousness: (0)   Alert, keenly responsive    LOC questions: (2)   Answers neither question correctly    LOC commands: (0)   Performs both tasks correctly    Best gaze: (0)   Normal    Visual: (0)   No visual loss    Facial palsy: (1)   Minor paralysis (flat nasolabial fold, smile asymmetry)    Motor arm (left): (0)   No drift    Motor arm (right): (0)   No drift    Motor leg (left): (0)   No drift    Motor leg (right): (0)   No drift    Limb ataxia: (0)   Absent    Sensory: (0)   Normal- no sensory loss    Best language: (2)   Severe aphasia    Dysarthria: (1)   Mild to moderate dysarthria    Extinction and inattention: (0)   No abnormality        Total Score:  6         LABS  (most recent Cr, BUN, GFR, PLT, INR, PTT within the past 7 days):  Recent Labs  Lab 12/09/17  0641 12/08/17  1545   CR 1.17 1.13   BUN 26 26   GFRESTIMATED 62 64   GFRESTBLACK 75 78    237   INR 1.23* 1.25*   PTT  --  29        Platelet Function P2Y12 (PRU):  Not applicable      ASSESSMENT:  72 year old man with vascular risk factors - severe left Cervical ICA stenosis, which is symptomatic given patient's stroke distribution.    PLAN: Diagnostic angiogram with intention to treat Left Cervical ICA stenosis        PRE-PROCEDURE SEDATION ASSESSMENT      Pre-Procedure Sedation Assessment done at 1130.    Expected Level:  Moderate Sedation    Indication:  Sedation is required to allow for neurointerventional procedure.    Consent obtained from spouse after discussing the risks, benefits and alternatives.     PO Intake:  Appropriately NPO for procedure    ASA Class:  Class 4 - SEVERE SYSTEMIC DISEASE, ACUTE UNSTABLE PROBLEMS.    Mallampati:  Grade 3:  Soft palate visible, posterior pharyngeal wall not visible    History and physical reviewed and no updates needed. I have reviewed the lab findings, diagnostic data, medications, and the plan for sedation. I have determined this patient to be an appropriate candidate for the planned sedation and procedure and have reassessed the patient IMMEDIATELY PRIOR to sedation and procedure.    Patient was discussed with the Attending, Dr. Neff, who agrees with the plan.    Lyle Stone   Pager: [phone]

## 2017-12-09 NOTE — PROGRESS NOTES
D: Patient arrived at 1530. Table ready at 1530              Seven Cat underwent a  Cerebral  angiogram  by Dr Diaz on 12/9/2017   Time out done. Yes               Patient identity confirmed with 2 identifiers  Yes                             Support staff present for case :Dr. Lepe and Chase arrived at 1530                                                           RN RACHEL Escobar,  arrived at 1530.                                                           Onel Turner arrived at 1530    A:  Patient moved to table, monitoring equipment applied. Insertion site prepared by technologist.    Start time of procedure: 1545   Puncture made to : RFA   At  1550   Intervention done: , Stent placed LICA, Angioplasty,                                                  ,     Sedation Medication given:   Yes, see MAR                Medication total dose given- Versed  4 mg                                                        Fentanyl  200  Mcg                                                           IR RN Monitoring Times: Start:1530                                                   Stop:1835     Introducer removed,     Closure device deployed    Groin site appearance : d/i   End time of procedure : 1825       R:   Neuros baseline from pre procedure       P:  Patient to recover in 4A      Post Procedure Education:  The following information has been reviewed with patient   1. Maintain bedrest with RLE extremity straight until 2025   2. Notify nurse immediately if having any bleeding from site, any changes in sensation, or changes in strength.   3. Notify nurse if you have to go the bathroom, the staff will assist you.   4. Nurses will be doing frequent assessments post procedure, which will include                   checking the pulses in your feet, groin/access site, vital signs, and neuro exam.    5. Please press your call light if you, or your family, have any questions or concerns        regarding your care.

## 2017-12-09 NOTE — CONSULTS
"MEDICAL ONCOLOGY CONSULT  Dec 8, 2017    ASSESSMENT AND PLAN  #1 L MCA ischemic infarct s/p tPA 12/8  #2 Documented history of AIHA 2015, previously treated with steroids  #3 JOSE CARLOS IgG positive/Complement negative  #4 Hashimoto's thryroiditis  #5 DM with neuropathy and retinopathy  #6 HTN  #7 HLD  #8 BPH     Mr. Cat is a 72 year old man with a documented history of AIHA, previously on steroids, now admitted following significant L MCA infarct s/p tPA.  Hemoglobin now stable in setting of recent steroid administration for AIHA recurrence supported by positive JOSE CARLOS, acute anemia with increased RBC production, haptoglobin, and elevated LDH.  Smear shows no signs of DIC.    RECOMMENDATIONS:  --Please initiate prednisone 60 mg BID.  Will follow clinical course and design steroid taper accordingly.    Michael Lama MD/PhD  Fellow, Hematology, Oncology and Transplantation  [facility]    Attending Note:  I have reviewed the patient chart, and interviewed and examined the patient.  I agree with the assessment and plan.  Cammie Cabello MD  Hematology    REVIEW OF SYSTEMS  A 12-point ROS could not be ascertained due to patient condition.     SOCIAL HISTORY  History   Smoking Status     Not on file   Smokeless Tobacco     Not on file       Alcohol use Not on file    History   Drug Use Not on file       FAMILY HISTORY  No family history on file.    PHYSICAL EXAMINATION  /76  Temp 98.6  F (37  C) (Axillary)  Resp 20  Ht 1.778 m (5' 10\")  Wt 124 kg (273 lb 5.9 oz)  SpO2 96%  BMI 39.22 kg/m2  Wt Readings from Last 2 Encounters:   12/08/17 124 kg (273 lb 5.9 oz)     Physical Exam   Constitutional: He appears well-developed and well-nourished.   HENT:   Head: Normocephalic and atraumatic.   Eyes: Conjunctivae and EOM are normal.   Cardiovascular: Normal rate and regular rhythm.    Pulmonary/Chest: Effort normal and breath sounds normal.   Abdominal: Soft.   Neurological: He is alert.   Marked aphasia.  " Response to questions limited to yes or no answers.       Labs and imaging from the past 24 hours reviewed.      Recent Labs   Lab Test  12/09/17   0641  12/08/17   1545   WBC  8.6  8.8   NEUTROPHIL   --   87.0   HGB  8.4*  7.8*   PLT  229  237     Recent Labs   Lab Test  12/09/17   0641  12/08/17   1545   NA  141  136   POTASSIUM  4.5  4.6   CHLORIDE  110*  106   CO2  22  21   ANIONGAP  9  9   BUN  26  26   CR  1.17  1.13   IZABELLA  8.9  8.8     Recent Labs   Lab Test  12/08/17   1545   LDH  340*     Recent Labs   Lab Test  12/08/17   1545   BILITOTAL  2.8*   ALKPHOS  103   ALT  25   AST  23   ALBUMIN  3.2*   LDH  340*     Results for SEVEN NEWMAN (MRN [id number]) as of 12/9/2017 14:43   Ref. Range 12/8/2017 15:45 12/8/2017 15:45   JOSE CARLOS  Broad Spectrum Unknown Pos 1+ Not done      Smear- Reviewed by Irineo and Daina- increased polychromiasia, numerous spherocytes, no schistocytes. Normal WBC. Normal platelets.     CHIEF COMPLAINT:  stroke    HISTORY OF PRESENT ILLNESS  Seven Newman is a 72 year old man with a PMHx Hashimotos thyroiditis, DM with neuropathy/retinopathy, HTN, autoimmune/idiopathic hemolytic anemia, HLD, BPH, cervical radiculopathy, including now admitted with L MCA syndrome s/p tPA after being admitted to an OSH for dyspnea; there he was found to have Hb of 5.5.  He was, per report transfused with multiple units of RBCs and transferred to [facility].       Hemoglobin has since remained at 8.4; JOSE CARLOS is positive for IgG, negative for complement.  LDH elevated at 340, haptoglobin ordered but not drawn yet.  CMP otherwise largely unremarkable, CrCl 78. CBC,  Brain MRI show multiple small foci of acute infarcts in the left MCA distribution, with occlusion of the proximal left ICA; possible string sign present; similar findings on head/neck CTA.      Review of his outside records reveals he was diagnosed with AIHA in 2015.  No clear inciting cause.  Treated with a limited steroid taper, experienced  normalization of counts thereafter.     This year, received flu vaccine October 12th.  Believes he began experiencing shortness of breath shortly after that as well as dark urine.  Was seen by Dr. Lino at [facility] 12/7, found to have a Hgb of 5.5, started on prednisone 60 mg BID for recurrent AIHA. Haptoglobin undetectable.  Reticulocyte count suggests adequate production.      Peripheral smear reviewed; polychromasia, spherocytes noted.  No schistocytes seen.  Platelet clumping, leukoerythroblastic reaction noted.  Rouleax formation present.

## 2017-12-09 NOTE — PROGRESS NOTES
Writer able to communicate with patient  Only bu using ''yes or no'' questions. Any other words patient tries to use are garbles and incomprehensible. Patient does answer Orientation questions correctly using the ''yes or no'' questioning methods.  Patient is refusing cares with IV cares and medications. Only able to complete neurological assessment, blood glucose monitoring, and insulin administration. All attempts to explain to patient the reasoning for cares and medications has failed and patient continues to refuse. Patient beginning to get frustrated and agitated, will let patient calm down and attempt a different method. Unable to place NG for nutrition and medication administration- patient refusing at this time.- MD notified.

## 2017-12-09 NOTE — PROVIDER NOTIFICATION
"Patient non-compliant with medical care and requesting to leave NICU resident notified and security present. Patient now stating \"he will stay but no cares.\" Patient placed on monitor and Precedex gtt initiated. Patient refusing all other cares Md aware. Now placed on 72 hour hold. Attempting to avoid combative/aggressive behavior will allow minimal care for short period and re-assess when patient is calm  "

## 2017-12-09 NOTE — PROGRESS NOTES
Unable to complete neuro exams for 1730 and 1800 because pt was in MRI. Vitals were recorded for that time.

## 2017-12-10 ENCOUNTER — APPOINTMENT (OUTPATIENT)
Dept: CT IMAGING | Facility: CLINIC | Age: 72
DRG: 034 | End: 2017-12-10
Attending: STUDENT IN AN ORGANIZED HEALTH CARE EDUCATION/TRAINING PROGRAM
Payer: COMMERCIAL

## 2017-12-10 ENCOUNTER — APPOINTMENT (OUTPATIENT)
Dept: SPEECH THERAPY | Facility: CLINIC | Age: 72
DRG: 034 | End: 2017-12-10
Attending: PSYCHIATRY & NEUROLOGY
Payer: COMMERCIAL

## 2017-12-10 LAB
ANION GAP SERPL CALCULATED.3IONS-SCNC: 10 MMOL/L (ref 3–14)
BASE DEFICIT BLDA-SCNC: 6.9 MMOL/L
BUN SERPL-MCNC: 27 MG/DL (ref 7–30)
CALCIUM SERPL-MCNC: 7.7 MG/DL (ref 8.5–10.1)
CHLORIDE SERPL-SCNC: 111 MMOL/L (ref 94–109)
CO2 SERPL-SCNC: 20 MMOL/L (ref 20–32)
CREAT SERPL-MCNC: 1.25 MG/DL (ref 0.66–1.25)
ERYTHROCYTE [DISTWIDTH] IN BLOOD BY AUTOMATED COUNT: 20.2 % (ref 10–15)
ERYTHROCYTE [DISTWIDTH] IN BLOOD BY AUTOMATED COUNT: 21.1 % (ref 10–15)
GFR SERPL CREATININE-BSD FRML MDRD: 57 ML/MIN/1.7M2
GLUCOSE BLDC GLUCOMTR-MCNC: 118 MG/DL (ref 70–99)
GLUCOSE BLDC GLUCOMTR-MCNC: 136 MG/DL (ref 70–99)
GLUCOSE BLDC GLUCOMTR-MCNC: 155 MG/DL (ref 70–99)
GLUCOSE BLDC GLUCOMTR-MCNC: 170 MG/DL (ref 70–99)
GLUCOSE BLDC GLUCOMTR-MCNC: 177 MG/DL (ref 70–99)
GLUCOSE BLDC GLUCOMTR-MCNC: 190 MG/DL (ref 70–99)
GLUCOSE BLDC GLUCOMTR-MCNC: 195 MG/DL (ref 70–99)
GLUCOSE BLDC GLUCOMTR-MCNC: 197 MG/DL (ref 70–99)
GLUCOSE BLDC GLUCOMTR-MCNC: 217 MG/DL (ref 70–99)
GLUCOSE BLDC GLUCOMTR-MCNC: 242 MG/DL (ref 70–99)
GLUCOSE BLDC GLUCOMTR-MCNC: 247 MG/DL (ref 70–99)
GLUCOSE BLDC GLUCOMTR-MCNC: 258 MG/DL (ref 70–99)
GLUCOSE BLDC GLUCOMTR-MCNC: 279 MG/DL (ref 70–99)
GLUCOSE BLDC GLUCOMTR-MCNC: 282 MG/DL (ref 70–99)
GLUCOSE BLDC GLUCOMTR-MCNC: 287 MG/DL (ref 70–99)
GLUCOSE BLDC GLUCOMTR-MCNC: 345 MG/DL (ref 70–99)
GLUCOSE BLDC GLUCOMTR-MCNC: 356 MG/DL (ref 70–99)
GLUCOSE BLDC GLUCOMTR-MCNC: 358 MG/DL (ref 70–99)
GLUCOSE BLDC GLUCOMTR-MCNC: 359 MG/DL (ref 70–99)
GLUCOSE BLDC GLUCOMTR-MCNC: 368 MG/DL (ref 70–99)
GLUCOSE BLDC GLUCOMTR-MCNC: 392 MG/DL (ref 70–99)
GLUCOSE SERPL-MCNC: 360 MG/DL (ref 70–99)
HAPTOGLOB SERPL-MCNC: <6 MG/DL (ref 35–175)
HCO3 BLD-SCNC: 17 MMOL/L (ref 21–28)
HCT VFR BLD AUTO: 20.5 % (ref 40–53)
HCT VFR BLD AUTO: 22 % (ref 40–53)
HGB BLD-MCNC: 7 G/DL (ref 13.3–17.7)
HGB BLD-MCNC: 7.4 G/DL (ref 13.3–17.7)
MAGNESIUM SERPL-MCNC: 2.2 MG/DL (ref 1.6–2.3)
MCH RBC QN AUTO: 32.7 PG (ref 26.5–33)
MCH RBC QN AUTO: 33.3 PG (ref 26.5–33)
MCHC RBC AUTO-ENTMCNC: 33.6 G/DL (ref 31.5–36.5)
MCHC RBC AUTO-ENTMCNC: 34.1 G/DL (ref 31.5–36.5)
MCV RBC AUTO: 96 FL (ref 78–100)
MCV RBC AUTO: 99 FL (ref 78–100)
O2/TOTAL GAS SETTING VFR VENT: 21 %
PCO2 BLD: 26 MM HG (ref 35–45)
PH BLD: 7.42 PH (ref 7.35–7.45)
PHOSPHATE SERPL-MCNC: 3.6 MG/DL (ref 2.5–4.5)
PLATELET # BLD AUTO: 237 10E9/L (ref 150–450)
PLATELET # BLD AUTO: 261 10E9/L (ref 150–450)
PO2 BLD: 105 MM HG (ref 80–105)
POTASSIUM SERPL-SCNC: 4.7 MMOL/L (ref 3.4–5.3)
RBC # BLD AUTO: 2.14 10E12/L (ref 4.4–5.9)
RBC # BLD AUTO: 2.22 10E12/L (ref 4.4–5.9)
SODIUM SERPL-SCNC: 141 MMOL/L (ref 133–144)
TROPONIN I SERPL-MCNC: <0.015 UG/L (ref 0–0.04)
WBC # BLD AUTO: 7.2 10E9/L (ref 4–11)
WBC # BLD AUTO: 8.5 10E9/L (ref 4–11)

## 2017-12-10 PROCEDURE — 84484 ASSAY OF TROPONIN QUANT: CPT | Performed by: STUDENT IN AN ORGANIZED HEALTH CARE EDUCATION/TRAINING PROGRAM

## 2017-12-10 PROCEDURE — 80048 BASIC METABOLIC PNL TOTAL CA: CPT | Performed by: STUDENT IN AN ORGANIZED HEALTH CARE EDUCATION/TRAINING PROGRAM

## 2017-12-10 PROCEDURE — 93010 ELECTROCARDIOGRAM REPORT: CPT | Performed by: INTERNAL MEDICINE

## 2017-12-10 PROCEDURE — 93005 ELECTROCARDIOGRAM TRACING: CPT

## 2017-12-10 PROCEDURE — 70450 CT HEAD/BRAIN W/O DYE: CPT

## 2017-12-10 PROCEDURE — 86901 BLOOD TYPING SEROLOGIC RH(D): CPT | Performed by: STUDENT IN AN ORGANIZED HEALTH CARE EDUCATION/TRAINING PROGRAM

## 2017-12-10 PROCEDURE — 25000132 ZZH RX MED GY IP 250 OP 250 PS 637: Performed by: PSYCHIATRY & NEUROLOGY

## 2017-12-10 PROCEDURE — 85027 COMPLETE CBC AUTOMATED: CPT | Performed by: STUDENT IN AN ORGANIZED HEALTH CARE EDUCATION/TRAINING PROGRAM

## 2017-12-10 PROCEDURE — 84100 ASSAY OF PHOSPHORUS: CPT | Performed by: STUDENT IN AN ORGANIZED HEALTH CARE EDUCATION/TRAINING PROGRAM

## 2017-12-10 PROCEDURE — 00000146 ZZHCL STATISTIC GLUCOSE BY METER IP

## 2017-12-10 PROCEDURE — 93005 ELECTROCARDIOGRAM TRACING: CPT | Performed by: OPTOMETRIST

## 2017-12-10 PROCEDURE — 20000004 ZZH R&B ICU UMMC

## 2017-12-10 PROCEDURE — 36415 COLL VENOUS BLD VENIPUNCTURE: CPT | Performed by: INTERNAL MEDICINE

## 2017-12-10 PROCEDURE — 82803 BLOOD GASES ANY COMBINATION: CPT | Performed by: STUDENT IN AN ORGANIZED HEALTH CARE EDUCATION/TRAINING PROGRAM

## 2017-12-10 PROCEDURE — 25000128 H RX IP 250 OP 636: Performed by: STUDENT IN AN ORGANIZED HEALTH CARE EDUCATION/TRAINING PROGRAM

## 2017-12-10 PROCEDURE — 86850 RBC ANTIBODY SCREEN: CPT | Performed by: STUDENT IN AN ORGANIZED HEALTH CARE EDUCATION/TRAINING PROGRAM

## 2017-12-10 PROCEDURE — 36415 COLL VENOUS BLD VENIPUNCTURE: CPT | Performed by: STUDENT IN AN ORGANIZED HEALTH CARE EDUCATION/TRAINING PROGRAM

## 2017-12-10 PROCEDURE — 25000125 ZZHC RX 250: Performed by: PSYCHIATRY & NEUROLOGY

## 2017-12-10 PROCEDURE — 40000141 ZZH STATISTIC PERIPHERAL IV START W/O US GUIDANCE

## 2017-12-10 PROCEDURE — 83735 ASSAY OF MAGNESIUM: CPT | Performed by: STUDENT IN AN ORGANIZED HEALTH CARE EDUCATION/TRAINING PROGRAM

## 2017-12-10 PROCEDURE — 25000132 ZZH RX MED GY IP 250 OP 250 PS 637: Performed by: STUDENT IN AN ORGANIZED HEALTH CARE EDUCATION/TRAINING PROGRAM

## 2017-12-10 PROCEDURE — 40000275 ZZH STATISTIC RCP TIME EA 10 MIN: Performed by: OPTOMETRIST

## 2017-12-10 PROCEDURE — 85027 COMPLETE CBC AUTOMATED: CPT | Performed by: INTERNAL MEDICINE

## 2017-12-10 PROCEDURE — 25000125 ZZHC RX 250: Performed by: NURSE PRACTITIONER

## 2017-12-10 PROCEDURE — 25000125 ZZHC RX 250: Performed by: STUDENT IN AN ORGANIZED HEALTH CARE EDUCATION/TRAINING PROGRAM

## 2017-12-10 PROCEDURE — 25000128 H RX IP 250 OP 636

## 2017-12-10 PROCEDURE — 86900 BLOOD TYPING SEROLOGIC ABO: CPT | Performed by: STUDENT IN AN ORGANIZED HEALTH CARE EDUCATION/TRAINING PROGRAM

## 2017-12-10 PROCEDURE — 25000131 ZZH RX MED GY IP 250 OP 636 PS 637: Performed by: STUDENT IN AN ORGANIZED HEALTH CARE EDUCATION/TRAINING PROGRAM

## 2017-12-10 PROCEDURE — 92526 ORAL FUNCTION THERAPY: CPT | Mod: GN

## 2017-12-10 PROCEDURE — 40000225 ZZH STATISTIC SLP WARD VISIT

## 2017-12-10 PROCEDURE — 25000128 H RX IP 250 OP 636: Performed by: PSYCHIATRY & NEUROLOGY

## 2017-12-10 RX ORDER — CLOPIDOGREL BISULFATE 75 MG/1
75 TABLET ORAL DAILY
Status: DISCONTINUED | OUTPATIENT
Start: 2017-12-10 | End: 2017-12-15 | Stop reason: HOSPADM

## 2017-12-10 RX ORDER — POLYETHYLENE GLYCOL 3350 17 G/17G
17 POWDER, FOR SOLUTION ORAL DAILY
Status: DISCONTINUED | OUTPATIENT
Start: 2017-12-10 | End: 2017-12-15 | Stop reason: HOSPADM

## 2017-12-10 RX ORDER — LISINOPRIL 20 MG/1
20 TABLET ORAL DAILY
Status: DISCONTINUED | OUTPATIENT
Start: 2017-12-10 | End: 2017-12-13

## 2017-12-10 RX ORDER — AMOXICILLIN 250 MG
2 CAPSULE ORAL 2 TIMES DAILY
Status: DISCONTINUED | OUTPATIENT
Start: 2017-12-10 | End: 2017-12-15 | Stop reason: HOSPADM

## 2017-12-10 RX ORDER — OLANZAPINE 2.5 MG/1
5 TABLET, FILM COATED ORAL EVERY 8 HOURS PRN
Status: DISCONTINUED | OUTPATIENT
Start: 2017-12-10 | End: 2017-12-15 | Stop reason: HOSPADM

## 2017-12-10 RX ORDER — SODIUM CHLORIDE 9 MG/ML
INJECTION, SOLUTION INTRAVENOUS CONTINUOUS
Status: DISCONTINUED | OUTPATIENT
Start: 2017-12-10 | End: 2017-12-12

## 2017-12-10 RX ORDER — DEXTROSE MONOHYDRATE 25 G/50ML
25-50 INJECTION, SOLUTION INTRAVENOUS
Status: DISCONTINUED | OUTPATIENT
Start: 2017-12-10 | End: 2017-12-15 | Stop reason: HOSPADM

## 2017-12-10 RX ORDER — NICOTINE POLACRILEX 4 MG
15-30 LOZENGE BUCCAL
Status: DISCONTINUED | OUTPATIENT
Start: 2017-12-10 | End: 2017-12-15 | Stop reason: HOSPADM

## 2017-12-10 RX ORDER — ALBUTEROL SULFATE 90 UG/1
2 AEROSOL, METERED RESPIRATORY (INHALATION) 4 TIMES DAILY PRN
Status: DISCONTINUED | OUTPATIENT
Start: 2017-12-10 | End: 2017-12-10

## 2017-12-10 RX ORDER — ALBUTEROL SULFATE 90 UG/1
2 AEROSOL, METERED RESPIRATORY (INHALATION) EVERY 4 HOURS PRN
Status: DISCONTINUED | OUTPATIENT
Start: 2017-12-10 | End: 2017-12-15 | Stop reason: HOSPADM

## 2017-12-10 RX ORDER — PANTOPRAZOLE SODIUM 40 MG/1
40 TABLET, DELAYED RELEASE ORAL EVERY MORNING
Status: DISCONTINUED | OUTPATIENT
Start: 2017-12-11 | End: 2017-12-15 | Stop reason: HOSPADM

## 2017-12-10 RX ADMIN — Medication 10 MG: at 13:24

## 2017-12-10 RX ADMIN — LISINOPRIL 20 MG: 20 TABLET ORAL at 14:43

## 2017-12-10 RX ADMIN — NICARDIPINE HYDROCHLORIDE 15 MG/HR: 2.5 INJECTION INTRAVENOUS at 03:30

## 2017-12-10 RX ADMIN — POLYETHYLENE GLYCOL 3350 17 G: 17 POWDER, FOR SOLUTION ORAL at 20:23

## 2017-12-10 RX ADMIN — INSULIN ASPART 9 UNITS: 100 INJECTION, SOLUTION INTRAVENOUS; SUBCUTANEOUS at 04:01

## 2017-12-10 RX ADMIN — HUMAN INSULIN 17.5 UNITS/HR: 100 INJECTION, SOLUTION SUBCUTANEOUS at 08:40

## 2017-12-10 RX ADMIN — LEVOTHYROXINE SODIUM 175 MCG: 50 TABLET ORAL at 09:12

## 2017-12-10 RX ADMIN — HUMAN INSULIN 15 UNITS/HR: 100 INJECTION, SOLUTION SUBCUTANEOUS at 23:16

## 2017-12-10 RX ADMIN — NICARDIPINE HYDROCHLORIDE 15 MG/HR: 2.5 INJECTION INTRAVENOUS at 08:57

## 2017-12-10 RX ADMIN — PREDNISONE 60 MG: 50 TABLET ORAL at 09:12

## 2017-12-10 RX ADMIN — PREDNISONE 60 MG: 50 TABLET ORAL at 20:20

## 2017-12-10 RX ADMIN — HUMAN INSULIN 13 UNITS/HR: 100 INJECTION, SOLUTION SUBCUTANEOUS at 15:50

## 2017-12-10 RX ADMIN — ATORVASTATIN CALCIUM 40 MG: 40 TABLET, FILM COATED ORAL at 09:12

## 2017-12-10 RX ADMIN — NICARDIPINE HYDROCHLORIDE 12.5 MG/HR: 2.5 INJECTION INTRAVENOUS at 00:46

## 2017-12-10 RX ADMIN — NICARDIPINE HYDROCHLORIDE 12.5 MG/HR: 2.5 INJECTION INTRAVENOUS at 15:09

## 2017-12-10 RX ADMIN — SODIUM CHLORIDE 2.5 MG/HR: 9 INJECTION, SOLUTION INTRAVENOUS at 17:18

## 2017-12-10 RX ADMIN — ASPIRIN 325 MG ORAL TABLET 325 MG: 325 PILL ORAL at 09:12

## 2017-12-10 RX ADMIN — HUMAN INSULIN 9 UNITS/HR: 100 INJECTION, SOLUTION SUBCUTANEOUS at 20:09

## 2017-12-10 RX ADMIN — PANTOPRAZOLE SODIUM 40 MG: 40 INJECTION, POWDER, FOR SOLUTION INTRAVENOUS at 09:12

## 2017-12-10 RX ADMIN — NICARDIPINE HYDROCHLORIDE 15 MG/HR: 2.5 INJECTION INTRAVENOUS at 11:53

## 2017-12-10 RX ADMIN — HUMAN INSULIN 5 UNITS/HR: 100 INJECTION, SOLUTION SUBCUTANEOUS at 06:00

## 2017-12-10 RX ADMIN — THIAMINE HYDROCHLORIDE 300 MG: 100 INJECTION, SOLUTION INTRAMUSCULAR; INTRAVENOUS at 09:29

## 2017-12-10 RX ADMIN — CLOPIDOGREL 75 MG: 75 TABLET, FILM COATED ORAL at 14:42

## 2017-12-10 RX ADMIN — THIAMINE HYDROCHLORIDE 300 MG: 100 INJECTION, SOLUTION INTRAMUSCULAR; INTRAVENOUS at 20:24

## 2017-12-10 RX ADMIN — NICARDIPINE HYDROCHLORIDE 15 MG/HR: 2.5 INJECTION INTRAVENOUS at 06:30

## 2017-12-10 RX ADMIN — THIAMINE HYDROCHLORIDE 300 MG: 100 INJECTION, SOLUTION INTRAMUSCULAR; INTRAVENOUS at 14:25

## 2017-12-10 RX ADMIN — HUMAN INSULIN 12.5 UNITS/HR: 100 INJECTION, SOLUTION SUBCUTANEOUS at 14:01

## 2017-12-10 RX ADMIN — Medication 10 MG: at 15:42

## 2017-12-10 RX ADMIN — SODIUM CHLORIDE: 9 INJECTION, SOLUTION INTRAVENOUS at 20:11

## 2017-12-10 RX ADMIN — HUMAN INSULIN 14 UNITS/HR: 100 INJECTION, SOLUTION SUBCUTANEOUS at 10:53

## 2017-12-10 ASSESSMENT — VISUAL ACUITY
OU: GLASSES

## 2017-12-10 ASSESSMENT — PAIN DESCRIPTION - DESCRIPTORS
DESCRIPTORS: PATIENT UNABLE TO DESCRIBE
DESCRIPTORS: PRESSURE

## 2017-12-10 NOTE — PLAN OF CARE
Problem: Patient Care Overview  Goal: Plan of Care/Patient Progress Review  PT 4A: Cx, eval initiated, however had to cancel/discontinue as pt with report of pain. Therapist stepped out and RN further met with pt, reports pt having chest pain and N/A for visit.

## 2017-12-10 NOTE — PROVIDER NOTIFICATION
Spoke w/ MD Barrios Boss to notify Pt's blood sugar continues to be labile on insulin drip. No new orders placed. Will continue to monitor.

## 2017-12-10 NOTE — PLAN OF CARE
"Problem: Patient Care Overview  Goal: Plan of Care/Patient Progress Review  Discharge Planner SLP   Patient plan for discharge: unknown  Current status: Recommend continue dysphagia diet level 2 with nectar-thick liquids with supervision/assist. Pt to sit upright for all PO intake and take small bites/sips. Pt with overt aphasia. Frustrated and agitated and making no attempt to interact with ST throughout most of session. Intermittently answered \"no\", otherwise just looked away at the TV. Did indicate frustration with TV being muted by banging on the table.  Barriers to return to prior living situation: aphasia, dysphagia  Recommendations for discharge: inpatient rehab  Rationale for recommendations: below baseline function       Entered by: Andree Peoples 12/10/2017 9:19 AM           "

## 2017-12-10 NOTE — PLAN OF CARE
Problem: Patient Care Overview  Goal: Plan of Care/Patient Progress Review  PT 4A: Cx, pt with another provider at time of attempt. Will re-attempt eval later as schedule permits.

## 2017-12-10 NOTE — PLAN OF CARE
Problem: Patient Care Overview  Goal: Plan of Care/Patient Progress Review  Outcome: Improving  Neuros unchanged overnight. Right facial droop, RUE weakness, and expressive aphasia continue, with mild improvement in aphasia. Able to speak a few words in addition to yes/no. Answers questions appropriately most of the time. Increased agitation and restlessness around 0400, concurrent with blood glucose 350s, unresponsive to scheduled sliding scale insulin. Insulin gtt initiated. C/o pain in chest, 12-lead and troponin negative. Nicardipine gtt max rate 15 mg/hr to maintain SBP<120, has not required additional PRN meds. Oozing ar right groin site in evening requiring manual pressure, resolved with quick clot. CMS intact, +pulses. Weaned to RA. Tolerating nectar thick liquids. Voiding in urinal.     Plan: q1h neuro exams. Blood glucose management.

## 2017-12-10 NOTE — PHARMACY-ADMISSION MEDICATION HISTORY
Admission medication history interview status for the 12/8/2017 admission is complete. See Epic admission navigator for allergy information, pharmacy, prior to admission medications and immunization status.     Medication history interview sources:  Patients' wife    Changes made to PTA medication list (reason)  Added: levothyroxine, atorvastatin, aspirin, tamsulosin, folic acid, vitamin D  Deleted: None  Changed: None    Additional medication history information (including reliability of information, actions taken by pharmacist):  Patient slightly agitated, wife brought in medication bottles for reference.  Confirmed allergies. Stated allergy to rosiglitazone-swelling-updated  Confirmed pharmacy at the [facility]  Influenza vaccine: Yes, 10/2017  Humilin N dosing was reported by  Wife at 103 units in the AM and 103 units in the PM. Because this seems like a very high dose, it was left out from the med list.  Novolog is being used per a sliding scale. Wife did not know what it was and [facility].  Patient's wife also stated that gabapentin and naproxen are prescribed, but he very rarely takes them as needed.   Prior to Admission medications    Medication Sig Last Dose Taking? Auth Provider   LEVOTHYROXINE SODIUM PO Take 175 mcg by mouth daily 12/6/2017 Yes Unknown, Entered By History   ATORVASTATIN CALCIUM PO Take 40 mg by mouth daily 12/6/2017 Yes Unknown, Entered By History   ASPIRIN PO Take 81 mg by mouth daily 12/6/2017 Yes Unknown, Entered By History   TAMSULOSIN HCL PO Take 0.4 mg by mouth daily 12/6/2017 Yes Unknown, Entered By History   VITAMIN D, CHOLECALCIFEROL, PO Take 1,000 Units by mouth daily 12/6/2017 Yes Unknown, Entered By History   FOLIC ACID PO Take 1 mg by mouth daily 12/6/2017 Yes Unknown, Entered By History   insulin aspart (NOVOLOG FLEXPEN) 100 UNIT/ML injection Use per sliding scale  Yes Unknown, Entered By History   Insulin NPH Human, Isophane, (HUMULIN N PEN  SC)   Yes Unknown, Entered By History             Medication history completed by: Arun Varela, PD2 Student Pharmacist

## 2017-12-10 NOTE — PROGRESS NOTES
[facility], [place]     Endovascular Surgical Neuroradiology Post-Procedure Note    Pre-Procedure Diagnosis:  Symptomatic Left Internal Carotid Disease  Post-Procedure Diagnosis:  Symptomatic Left Internal Carotid Disease    Procedure(s):   Extracranial arterial angioplasty and stenting    Findings:  Pre procedural 99% stenosis, Post angioplasty and stenting <30% residual stenosis.     Primary Surgeon:  Dr. Dominic Neff  Secondary Surgeon:  Not applicable  Secondary Surgeon Review:  None  Fellow:  Chase Lepe  Additional Assistants:      Prior to the start of the procedure and with procedural staff participation, I verbally confirmed: the patient s identity using two indicators, relevant allergies, that the procedure was appropriate and matched the consent or emergent situation, and that the correct equipment/implants were available. Immediately prior to starting the procedure I conducted the Time Out with the procedural staff and re-confirmed the patient s name, procedure, and site/side. (The Joint Commission universal protocol was followed.)  Yes    PRU value: Not applicable    Anesthesia:  Conscious Sedation  Medications:  Fentanyl, Heparin, Midazolam 100, 26074 , 4, Hydralazin 20, Labetalol 10, Nicardipine drip   Puncture site:  Right Femoral Artery    Fluoroscopy time (minutes):  79  Radiation dose (mGy):  1133    Contrast amount (mL):  100     Estimated blood loss (mL):  100 CC    Closure:  Device AngioSeal    Disposition:  Will be followed in hospital by the Neuro Critical Care/Stroke team.        Sedation Post-Procedure Summary    Sedatives: Fentanyl and Midazolam (Versed) 200, 4    Vital signs and pulse oximetry were monitored and remained stable throughout the procedure, and sedation was maintained until the procedure was complete.  The patient was monitored by staff until sedation discharge criteria were met.    Patient tolerance:  Patient tolerated the procedure well  with no immediate complications.    Time of sedation in minutes:  165 minutes from beginning to end of physician one to one monitoring.    Lyle Stone  Pager:  [phone]

## 2017-12-10 NOTE — PROVIDER NOTIFICATION
MD Ojeda came and assessed Pt due to Pt reporting vision changes. MD Ojeda came and assessed Pt and Pt reported symptoms resolved. Will continue to monitor.

## 2017-12-10 NOTE — PLAN OF CARE
Problem: Patient Care Overview  Goal: Plan of Care/Patient Progress Review    OT-4a: Cx- set up pt to ambulate this PM, but pt adamantly declining despite strong encouragement from family and providers, potentially due to fatigue, but difficult to obtain due to aphasia.

## 2017-12-10 NOTE — PROGRESS NOTES
NeuroIR Progress Note    12/10/2017    24 hour events:  Overnight remained stable.    24 Hour Vital Signs Summary:  Temperatures:  Current - Temp: 98.2  F (36.8  C); Max - Temp  Av.7  F (37.1  C)  Min: 98.2  F (36.8  C)  Max: 99.2  F (37.3  C)  Respiration range: Resp  Av.7  Min: 9  Max: 30  Pulse range: No Data Recorded  Blood pressure range: Systolic (24hrs), Av , Min:96 , Max:161   ; Diastolic (24hrs), Av, Min:41, Max:83    Pulse oximetry range: SpO2  Av.3 %  Min: 95 %  Max: 100 %    Ventilator Settings  Resp: 18      Intake/Output Summary (Last 24 hours) at 12/10/17 0856  Last data filed at 12/10/17 0730   Gross per 24 hour   Intake          1279.58 ml   Output             1880 ml   Net          -600.42 ml       BP - Mean:  [] 82    Current Medications:    atorvastatin  40 mg Oral or Feeding Tube Daily     aspirin  325 mg Oral or Feeding Tube Daily     levothyroxine  175 mcg Oral or Feeding Tube QAM AC     predniSONE  60 mg Oral BID w/meals     pantoprazole (PROTONIX) IV PEDS/NICU  40 mg Intravenous Daily     sodium chloride (PF)  3 mL Intracatheter Q8H     thiamine  300 mg Intravenous TID       PRN Medications:  IV fluid REPLACEMENT ONLY, glucose **OR** dextrose **OR** glucagon, HOLD MEDICATION, ondansetron **OR** ondansetron, prochlorperazine **OR** prochlorperazine **OR** prochlorperazine, metoclopramide **OR** metoclopramide, hydrALAZINE, labetalol, - MEDICATION INSTRUCTIONS -, - MEDICATION INSTRUCTIONS -, naloxone, lidocaine (buffered or not buffered), lidocaine 4%, potassium chloride, potassium chloride, potassium chloride, potassium chloride with lidocaine, magnesium sulfate, potassium phosphate (KPHOS) in D5W IV, potassium phosphate (KPHOS) in D5W IV, potassium phosphate (KPHOS) in D5W IV, potassium phosphate (KPHOS) in D5W IV, lidocaine (buffered or not buffered), lidocaine 4%, sodium chloride (PF)    Infusions:    IV fluid REPLACEMENT ONLY       insulin (regular) 17.5  "Units/hr (12/10/17 0840)     niCARdipine 40 mg in 200 mL 0.9% NaCl 15 mg/hr (12/10/17 0750)     - MEDICATION INSTRUCTIONS -       - MEDICATION INSTRUCTIONS -       dexmedetomidine Stopped (12/09/17 0601)       Not on File    Physical Examination:  /59  Temp 98.2  F (36.8  C)  Resp 18  Ht 1.778 m (5' 10\")  Wt 124 kg (273 lb 5.9 oz)  SpO2 97%  BMI 39.22 kg/m2  MENTAL STATUS:  Alert. Severe expressive aphasia, follows command.   CRANIAL NERVES: Pupils are equal, round, reactive to light, subtle right facial droop.  Extraocular movements full.  Visual fields full.  Facial sensation, movement normal.  Palate moves symmetrically.  Tongue midline.  Sternocleidomastoid and trapezius strength intact.  Neck strength was normal.  No bruits.   NEUROLOGIC:  Motor 5/5.  Reflexes 2/4.  Toe signs downgoing.  Good finger-nose-finger, fine finger movement, heel-shin maneuver, sensation to light touch, position sense and double simultaneous stimulation.      Labs/Studies:  Recent Labs   Lab Test  12/10/17   0511  12/09/17   0641  12/08/17   1545   NA  141  141  136   POTASSIUM  4.7  4.5  4.6   CHLORIDE  111*  110*  106   CO2  20  22  21   ANIONGAP  10  9  9   GLC  360*  161*  265*   BUN  27  26  26   CR  1.25  1.17  1.13   IZABELLA  7.7*  8.9  8.8   WBC  7.2  8.6  8.8   RBC  2.14*  2.59*  2.44*   HGB  7.0*  8.4*  7.8*   PLT  237  229  237       Recent Labs   Lab Test  12/09/17   0641  12/08/17   1545   INR  1.23*  1.25*   PTT   --   29           Recent Labs  Lab 12/09/17  0635   O2PER 21.0           Imaging:  Reviewed    Assessment/Plan  Seven Cat is a 72 year old h/o admitted 12/8/2017 with Left MCA territory strokes due to symptomatic Left ICA disease S/P Stenting.     - Stable clinical exam at current blood pressure goal  - Continue BP goal <120  -  and Plavix 75  - Groin site had subcutaneous oozing overnight, no hematoma seen this AM    Will continue to follow    Plan discussed with Dr. Aishwarya Alvarenga " Chase  Fellow

## 2017-12-11 ENCOUNTER — APPOINTMENT (OUTPATIENT)
Dept: SPEECH THERAPY | Facility: CLINIC | Age: 72
DRG: 034 | End: 2017-12-11
Attending: PSYCHIATRY & NEUROLOGY
Payer: COMMERCIAL

## 2017-12-11 LAB
ABO + RH BLD: ABNORMAL
ABO + RH BLD: ABNORMAL
ANION GAP SERPL CALCULATED.3IONS-SCNC: 11 MMOL/L (ref 3–14)
APTT PPP: 29 SEC (ref 22–37)
BACTERIA SPEC CULT: NORMAL
BASE DEFICIT BLDA-SCNC: 4.6 MMOL/L
BLD GP AB INVEST PLASRBC-IMP: ABNORMAL
BLD GP AB SCN SERPL QL ELUTION: ABNORMAL
BLD GP AB SCN SERPL QL: ABNORMAL
BLD PROD TYP BPU: ABNORMAL
BLD PROD TYP BPU: NORMAL
BLD UNIT ID BPU: 0
BLOOD BANK CMNT PATIENT-IMP: ABNORMAL
BLOOD BANK CMNT PATIENT-IMP: ABNORMAL
BLOOD PRODUCT CODE: NORMAL
BPU ID: NORMAL
BUN SERPL-MCNC: 35 MG/DL (ref 7–30)
CALCIUM SERPL-MCNC: 8.2 MG/DL (ref 8.5–10.1)
CHLORIDE SERPL-SCNC: 114 MMOL/L (ref 94–109)
CO2 SERPL-SCNC: 19 MMOL/L (ref 20–32)
COPATH REPORT: NORMAL
CREAT SERPL-MCNC: 1.54 MG/DL (ref 0.66–1.25)
DAT C3-SP REAG RBC QL: ABNORMAL
DAT IGG-SP REAG RBC-IMP: ABNORMAL
DAT POLY-SP REAG RBC QL: ABNORMAL
ERYTHROCYTE [DISTWIDTH] IN BLOOD BY AUTOMATED COUNT: 21.4 % (ref 10–15)
ERYTHROCYTE [DISTWIDTH] IN BLOOD BY AUTOMATED COUNT: 22.3 % (ref 10–15)
ERYTHROCYTE [DISTWIDTH] IN BLOOD BY AUTOMATED COUNT: 23.1 % (ref 10–15)
GFR SERPL CREATININE-BSD FRML MDRD: 45 ML/MIN/1.7M2
GLUCOSE BLDC GLUCOMTR-MCNC: 107 MG/DL (ref 70–99)
GLUCOSE BLDC GLUCOMTR-MCNC: 108 MG/DL (ref 70–99)
GLUCOSE BLDC GLUCOMTR-MCNC: 108 MG/DL (ref 70–99)
GLUCOSE BLDC GLUCOMTR-MCNC: 114 MG/DL (ref 70–99)
GLUCOSE BLDC GLUCOMTR-MCNC: 119 MG/DL (ref 70–99)
GLUCOSE BLDC GLUCOMTR-MCNC: 125 MG/DL (ref 70–99)
GLUCOSE BLDC GLUCOMTR-MCNC: 126 MG/DL (ref 70–99)
GLUCOSE BLDC GLUCOMTR-MCNC: 128 MG/DL (ref 70–99)
GLUCOSE BLDC GLUCOMTR-MCNC: 134 MG/DL (ref 70–99)
GLUCOSE BLDC GLUCOMTR-MCNC: 146 MG/DL (ref 70–99)
GLUCOSE BLDC GLUCOMTR-MCNC: 148 MG/DL (ref 70–99)
GLUCOSE BLDC GLUCOMTR-MCNC: 155 MG/DL (ref 70–99)
GLUCOSE BLDC GLUCOMTR-MCNC: 161 MG/DL (ref 70–99)
GLUCOSE BLDC GLUCOMTR-MCNC: 163 MG/DL (ref 70–99)
GLUCOSE BLDC GLUCOMTR-MCNC: 164 MG/DL (ref 70–99)
GLUCOSE BLDC GLUCOMTR-MCNC: 167 MG/DL (ref 70–99)
GLUCOSE BLDC GLUCOMTR-MCNC: 174 MG/DL (ref 70–99)
GLUCOSE BLDC GLUCOMTR-MCNC: 180 MG/DL (ref 70–99)
GLUCOSE BLDC GLUCOMTR-MCNC: 186 MG/DL (ref 70–99)
GLUCOSE BLDC GLUCOMTR-MCNC: 194 MG/DL (ref 70–99)
GLUCOSE BLDC GLUCOMTR-MCNC: 221 MG/DL (ref 70–99)
GLUCOSE BLDC GLUCOMTR-MCNC: 90 MG/DL (ref 70–99)
GLUCOSE BLDC GLUCOMTR-MCNC: 99 MG/DL (ref 70–99)
GLUCOSE SERPL-MCNC: 91 MG/DL (ref 70–99)
HCO3 BLD-SCNC: 20 MMOL/L (ref 21–28)
HCT VFR BLD AUTO: 20.8 % (ref 40–53)
HCT VFR BLD AUTO: 21.9 % (ref 40–53)
HCT VFR BLD AUTO: 22.2 % (ref 40–53)
HGB BLD-MCNC: 6.7 G/DL (ref 13.3–17.7)
HGB BLD-MCNC: 7.1 G/DL (ref 13.3–17.7)
HGB BLD-MCNC: 7.3 G/DL (ref 13.3–17.7)
INR PPP: 1.31 (ref 0.86–1.14)
INTERPRETATION ECG - MUSE: NORMAL
LACTATE BLD-SCNC: 1.8 MMOL/L (ref 0.7–2)
LACTATE BLD-SCNC: 3.8 MMOL/L (ref 0.7–2)
MAGNESIUM SERPL-MCNC: 2.5 MG/DL (ref 1.6–2.3)
MCH RBC QN AUTO: 31.2 PG (ref 26.5–33)
MCH RBC QN AUTO: 31.7 PG (ref 26.5–33)
MCH RBC QN AUTO: 32.3 PG (ref 26.5–33)
MCHC RBC AUTO-ENTMCNC: 32.2 G/DL (ref 31.5–36.5)
MCHC RBC AUTO-ENTMCNC: 32.4 G/DL (ref 31.5–36.5)
MCHC RBC AUTO-ENTMCNC: 32.9 G/DL (ref 31.5–36.5)
MCV RBC AUTO: 97 FL (ref 78–100)
MCV RBC AUTO: 98 FL (ref 78–100)
MCV RBC AUTO: 98 FL (ref 78–100)
NUM BPU REQUESTED: 2
O2/TOTAL GAS SETTING VFR VENT: ABNORMAL %
PCO2 BLD: 32 MM HG (ref 35–45)
PH BLD: 7.39 PH (ref 7.35–7.45)
PHOSPHATE SERPL-MCNC: 3.2 MG/DL (ref 2.5–4.5)
PLATELET # BLD AUTO: 200 10E9/L (ref 150–450)
PLATELET # BLD AUTO: 200 10E9/L (ref 150–450)
PLATELET # BLD AUTO: 251 10E9/L (ref 150–450)
PO2 BLD: 83 MM HG (ref 80–105)
POTASSIUM SERPL-SCNC: 4 MMOL/L (ref 3.4–5.3)
POTASSIUM SERPL-SCNC: 4.4 MMOL/L (ref 3.4–5.3)
RBC # BLD AUTO: 2.15 10E12/L (ref 4.4–5.9)
RBC # BLD AUTO: 2.24 10E12/L (ref 4.4–5.9)
RBC # BLD AUTO: 2.26 10E12/L (ref 4.4–5.9)
SODIUM SERPL-SCNC: 144 MMOL/L (ref 133–144)
SPECIMEN EXP DATE BLD: ABNORMAL
SPECIMEN SOURCE: NORMAL
TRANSFUSION STATUS PATIENT QL: NORMAL
TRANSFUSION STATUS PATIENT QL: NORMAL
WBC # BLD AUTO: 6.9 10E9/L (ref 4–11)
WBC # BLD AUTO: 9.3 10E9/L (ref 4–11)
WBC # BLD AUTO: 9.4 10E9/L (ref 4–11)

## 2017-12-11 PROCEDURE — 25000131 ZZH RX MED GY IP 250 OP 636 PS 637: Performed by: STUDENT IN AN ORGANIZED HEALTH CARE EDUCATION/TRAINING PROGRAM

## 2017-12-11 PROCEDURE — 85730 THROMBOPLASTIN TIME PARTIAL: CPT | Performed by: STUDENT IN AN ORGANIZED HEALTH CARE EDUCATION/TRAINING PROGRAM

## 2017-12-11 PROCEDURE — 25000125 ZZHC RX 250: Performed by: STUDENT IN AN ORGANIZED HEALTH CARE EDUCATION/TRAINING PROGRAM

## 2017-12-11 PROCEDURE — 85610 PROTHROMBIN TIME: CPT | Performed by: STUDENT IN AN ORGANIZED HEALTH CARE EDUCATION/TRAINING PROGRAM

## 2017-12-11 PROCEDURE — 84100 ASSAY OF PHOSPHORUS: CPT | Performed by: NURSE PRACTITIONER

## 2017-12-11 PROCEDURE — 83735 ASSAY OF MAGNESIUM: CPT | Performed by: NURSE PRACTITIONER

## 2017-12-11 PROCEDURE — 40000225 ZZH STATISTIC SLP WARD VISIT: Performed by: SPEECH-LANGUAGE PATHOLOGIST

## 2017-12-11 PROCEDURE — 25000132 ZZH RX MED GY IP 250 OP 250 PS 637: Performed by: STUDENT IN AN ORGANIZED HEALTH CARE EDUCATION/TRAINING PROGRAM

## 2017-12-11 PROCEDURE — 92526 ORAL FUNCTION THERAPY: CPT | Mod: GN | Performed by: SPEECH-LANGUAGE PATHOLOGIST

## 2017-12-11 PROCEDURE — 82803 BLOOD GASES ANY COMBINATION: CPT | Performed by: INTERNAL MEDICINE

## 2017-12-11 PROCEDURE — 80048 BASIC METABOLIC PNL TOTAL CA: CPT | Performed by: PSYCHIATRY & NEUROLOGY

## 2017-12-11 PROCEDURE — 00000146 ZZHCL STATISTIC GLUCOSE BY METER IP

## 2017-12-11 PROCEDURE — 80048 BASIC METABOLIC PNL TOTAL CA: CPT | Performed by: NURSE PRACTITIONER

## 2017-12-11 PROCEDURE — P9016 RBC LEUKOCYTES REDUCED: HCPCS | Performed by: NURSE PRACTITIONER

## 2017-12-11 PROCEDURE — 87040 BLOOD CULTURE FOR BACTERIA: CPT | Performed by: PSYCHIATRY & NEUROLOGY

## 2017-12-11 PROCEDURE — 40000556 ZZH STATISTIC PERIPHERAL IV START W US GUIDANCE

## 2017-12-11 PROCEDURE — 20000004 ZZH R&B ICU UMMC

## 2017-12-11 PROCEDURE — S0166 INJ OLANZAPINE 2.5MG: HCPCS | Performed by: PSYCHIATRY & NEUROLOGY

## 2017-12-11 PROCEDURE — 25000128 H RX IP 250 OP 636: Performed by: PSYCHIATRY & NEUROLOGY

## 2017-12-11 PROCEDURE — 25000128 H RX IP 250 OP 636

## 2017-12-11 PROCEDURE — 25000125 ZZHC RX 250: Performed by: PSYCHIATRY & NEUROLOGY

## 2017-12-11 PROCEDURE — 25000132 ZZH RX MED GY IP 250 OP 250 PS 637: Performed by: PSYCHIATRY & NEUROLOGY

## 2017-12-11 PROCEDURE — 85027 COMPLETE CBC AUTOMATED: CPT | Performed by: PSYCHIATRY & NEUROLOGY

## 2017-12-11 PROCEDURE — 84132 ASSAY OF SERUM POTASSIUM: CPT | Performed by: NURSE PRACTITIONER

## 2017-12-11 PROCEDURE — 83605 ASSAY OF LACTIC ACID: CPT | Performed by: PSYCHIATRY & NEUROLOGY

## 2017-12-11 PROCEDURE — 25000128 H RX IP 250 OP 636: Performed by: STUDENT IN AN ORGANIZED HEALTH CARE EDUCATION/TRAINING PROGRAM

## 2017-12-11 PROCEDURE — 85027 COMPLETE CBC AUTOMATED: CPT | Performed by: STUDENT IN AN ORGANIZED HEALTH CARE EDUCATION/TRAINING PROGRAM

## 2017-12-11 RX ORDER — ACETAMINOPHEN 325 MG/1
325-650 TABLET ORAL
Status: CANCELLED | OUTPATIENT
Start: 2017-12-11

## 2017-12-11 RX ORDER — DIPHENHYDRAMINE HYDROCHLORIDE 50 MG/ML
25-50 INJECTION INTRAMUSCULAR; INTRAVENOUS
Status: CANCELLED | OUTPATIENT
Start: 2017-12-11

## 2017-12-11 RX ORDER — QUETIAPINE FUMARATE 25 MG/1
25 TABLET, FILM COATED ORAL 2 TIMES DAILY
Status: DISCONTINUED | OUTPATIENT
Start: 2017-12-11 | End: 2017-12-13

## 2017-12-11 RX ORDER — DEXMEDETOMIDINE HYDROCHLORIDE 4 UG/ML
.1-.7 INJECTION, SOLUTION INTRAVENOUS CONTINUOUS
Status: DISCONTINUED | OUTPATIENT
Start: 2017-12-11 | End: 2017-12-12

## 2017-12-11 RX ORDER — OLANZAPINE 10 MG/2ML
5 INJECTION, POWDER, FOR SOLUTION INTRAMUSCULAR
Status: COMPLETED | OUTPATIENT
Start: 2017-12-11 | End: 2017-12-11

## 2017-12-11 RX ORDER — POTASSIUM CL/LIDO/0.9 % NACL 10MEQ/0.1L
10 INTRAVENOUS SOLUTION, PIGGYBACK (ML) INTRAVENOUS
Status: DISCONTINUED | OUTPATIENT
Start: 2017-12-11 | End: 2017-12-15 | Stop reason: HOSPADM

## 2017-12-11 RX ADMIN — ATORVASTATIN CALCIUM 40 MG: 40 TABLET, FILM COATED ORAL at 13:00

## 2017-12-11 RX ADMIN — SENNOSIDES AND DOCUSATE SODIUM 1 TABLET: 8.6; 5 TABLET ORAL at 20:35

## 2017-12-11 RX ADMIN — CLOPIDOGREL 75 MG: 75 TABLET, FILM COATED ORAL at 13:00

## 2017-12-11 RX ADMIN — LEVOTHYROXINE SODIUM 175 MCG: 50 TABLET ORAL at 13:00

## 2017-12-11 RX ADMIN — ASPIRIN 325 MG ORAL TABLET 325 MG: 325 PILL ORAL at 13:00

## 2017-12-11 RX ADMIN — PREDNISONE 60 MG: 50 TABLET ORAL at 20:34

## 2017-12-11 RX ADMIN — SODIUM CHLORIDE 500 ML: 9 INJECTION, SOLUTION INTRAVENOUS at 04:00

## 2017-12-11 RX ADMIN — SENNOSIDES AND DOCUSATE SODIUM 1 TABLET: 8.6; 5 TABLET ORAL at 13:00

## 2017-12-11 RX ADMIN — THIAMINE HYDROCHLORIDE 300 MG: 100 INJECTION, SOLUTION INTRAMUSCULAR; INTRAVENOUS at 09:01

## 2017-12-11 RX ADMIN — LISINOPRIL 20 MG: 20 TABLET ORAL at 13:00

## 2017-12-11 RX ADMIN — SODIUM CHLORIDE: 9 INJECTION, SOLUTION INTRAVENOUS at 12:00

## 2017-12-11 RX ADMIN — QUETIAPINE FUMARATE 25 MG: 25 TABLET ORAL at 20:34

## 2017-12-11 RX ADMIN — QUETIAPINE FUMARATE 25 MG: 25 TABLET ORAL at 13:00

## 2017-12-11 RX ADMIN — PREDNISONE 60 MG: 50 TABLET ORAL at 13:00

## 2017-12-11 RX ADMIN — OLANZAPINE 5 MG: 10 INJECTION, POWDER, LYOPHILIZED, FOR SOLUTION INTRAMUSCULAR at 00:18

## 2017-12-11 RX ADMIN — THIAMINE HYDROCHLORIDE 300 MG: 100 INJECTION, SOLUTION INTRAMUSCULAR; INTRAVENOUS at 14:22

## 2017-12-11 RX ADMIN — DEXMEDETOMIDINE HYDROCHLORIDE 0.1 MCG/KG/HR: 4 INJECTION, SOLUTION INTRAVENOUS at 02:58

## 2017-12-11 ASSESSMENT — VISUAL ACUITY
OU: NOT TESTABLE
OU: GLASSES
OU: NOT TESTABLE
OU: GLASSES
OU: NOT TESTABLE
OU: NOT TESTABLE
OU: GLASSES
OU: GLASSES
OU: NOT TESTABLE
OU: NOT TESTABLE
OU: GLASSES
OU: NOT TESTABLE
OU: GLASSES
OU: NOT TESTABLE

## 2017-12-11 NOTE — PROVIDER NOTIFICATION
MD Boss paged to notify pt's urine output low and Pt complaining of abdominal pain. Will continue to monitor.

## 2017-12-11 NOTE — PLAN OF CARE
Problem: Restraint for Non-Violent/Non-Self-Destructive Behavior  Goal: Prevent/Manage Potential Problems  Maintain safety of patient and others during period of restraint.  Promote psychological and physical wellbeing.  Prevent injury to skin and involved body parts.  Promote nutrition, hydration, and elimination.   Outcome: Declining  Pt extremely restless, agitated, combative, had removed 2 IV lines and all monitoring equipment, not redirectable, uncooperative. Soft wrist restraints applied in presence of MD who ordered restraints. Will continue to monitor and reassess need for restraints. Pt wife updated on phone of patient's current condition.

## 2017-12-11 NOTE — PROGRESS NOTES
Care Coordinator Progress Note     Admission Date/Time:  12/8/2017  Attending MD:  Timbo Bullard*     Data  Chart reviewed, discussed with interdisciplinary team.   [facility] syndrome  Pt was given tPA and transferred to [facility] for higher level of care.    Concerns with insurance coverage for discharge needs: None.  Current Living Situation: Patient lives with spouse.  Support System: Supportive and Involved  Transportation: Family or Friend will provide  Barriers to Discharge: pt is not medically ready for d/c.      Assessment  Nurse Care Coordination was consulted to assess needs of patient following stroke. Chart was reviewed and discussed with interdisciplinary team.     The team reported pt is medically stable to be transfer back to [facility] for continuous of care.  [facility] called [facility] referral [facility] # [phone] and talked to Arianne, referral specialist.  Arianne stated they don't have ICU bed currently and pt need to be off insulin drip and no ICU care needs before they can take him to the regula medical floor.  Arianne requested H&P and progress note to be faxed to Janet for review, Fax # [phone].  CC faxed the request info.     Per discussion with Neuro ICU team, pt is not eating well and unable to be weaned from Insulin drip today.  Endocrine have been consulted.     Received a call from pt [facility] out pt care coordinator, Isha.  Isha stated she can be contacted if we need assistance with rehab placement resource, if pt is not going to be transfer back to [facility].  Isha's contact # is [phone].       Plan  Anticipated Discharge Date:  1-2 days.  Anticipated Discharge Plan:   Transfer back to [facility].  CC will contact [facility] referral specialist, Arianne # [phone] tomorrow morning, 12/12 to give her update and to check their bed availability.  CC will cont. to follow plan of care.      Sadie Rodriguez, RN, PHN, BSN  4A  and 4E/ ICU  Care Coordinator  Phone: [phone]  Pager: [phone]

## 2017-12-11 NOTE — PROGRESS NOTES
Overnight Coverage Note: 12/11/2017 0020    Called to patient room for code 21.  Was at patient room 10 minutes prior due to patient being restless. Patient hasn't slept fully for two days, and has been agitated with ability to be redirected up unto 4 hours ago.  Patient sitting up in initial visit, breathing heavy, and difficult to communicate with due to aphasia.  Was initially redirected to lay back down and take an oral Zyprexa.  However, patient pulled lines out (two IV) and was agitated with nursing staff which lead to code 21.  Oral Zyprexa not given due to patient non-compliance, IM delivered at 0022 (5 mg ).  IV needed to be re-established due to delivery of both insulin and nicardipine for BP and BG control.      Recheck of temperature didn't show trend to elevation.  Respiratory rate declined with calming of patient after code. No breath sounds indicative for crackling or rales. SBP remained below goals of 120, but difficult to determine if this was the case during agitation episode.      Patient likely has elements of ICU delirium due to continued neuro-checks, tubes and wires, and poor sleep patterns along with expressive aphasia clouding his ability to respond appropriately to commands or communicate wants and needs.  No indications for continued infection, or other reasons for tachypnea without fever/blood pressure changes/tachycardia.  - redirection, maintain sleep/wake cycles, decrease wires/tubes/un-needed testing  - Zyprexa 5 mg IM given  - Seroquel 50 mg BID      ----------------------Addendum 0042------------------------------  Patient continues to pull at IV lines used for nicardipine and insulin delivery.  Occuring in bursts of activity and placing nursing staff at risk when redirecting due to patient striking with arms and legs.  - Soft restraints 4-point  -------------------------------------------------------------------------    Morning labs not ordered and upon re-establishing IV access  blood was drawn, decided to obtain basic labs and lactate for monitoring of anemia, possible infection, metabolic disturbances.  :  - CBC  - Lactate  - CoAgs  - BMP

## 2017-12-11 NOTE — PLAN OF CARE
Problem: Patient Care Overview  Goal: Plan of Care/Patient Progress Review  Outcome: Declining  Pt increasingly agitated and restless in evening, less cooperative. Around 0000 became extremely agitated, tore out IVs and pulled off monitoring equipment, then got out of bed and ran towards the door, could not be redirected. Code 21 called, pt was able to be redirected into bed but was compliant with nothing else. IM zyprexa given, effective, able to replace PIVs. Around 0200 pt became extremely restless again, precedex gtt started. Hgb 6.7, 1 unit RBC given. Hypotensive with BPs 70-90/40-50, 500cc NS bolus given, BPs improved to /50-60. Required blow-by O2 during period of extreme agitation due to desats to 85%. Pt voiding in urinal, stool in evening in commode. Insulin gtt continues, now at 2units/hr.     Plan: follow up on hemoglobin. q1h neuro exams.

## 2017-12-11 NOTE — CONSULTS
"Diabetes/Hyperglycemia Management Consult    Chief Complaint type 1 diabetic with very difficult to manage glucoses in the setting of a new stroke  Consult requested by: Dr. Navarro  History of Present Illness Seven Cat is a 72 year old male with history of  Diabetes, hypertension, hyperlipidemia, Hashimoto's thyroiditis,autoimmune hemolytic anemia ( blood transfusion for hemolysis), presented from [facility] after receiving tPA for left MCA stroke (12/9/2017).    Information was obtained via review of medical records and talking with Mrs. Cat.    Mr. Cat was started on IV insulin(2/10) plus Prednisone 60 mg twice daily for hemolytic anemia. IV insulin range from 0-14 units per hour. Prednisone 60 mg was charted as \"refused\" this am, IV insulin rates decreased to approximately 2.0 units/hour. Total 48 units,  glucose range . With Prednisone 60 mg IV insulin averages approximately 6.8 units/hour.    Mrs. Cat reports, he was dx with diabetes in 1980's. Developed diabetes after exposure of agent orange. Not sure if Mr. Cat is considered type 1 diabetic or type 2 diabetic, but he is \"insulin dependent.\" she is not sure of his meal Novolog sliding scale, but will determine the amount of Novolog based on what he is gong to eat. No recent hypoglycemia but blood sugars are variable. [facility], when glucometer was reviewed, glucose range 150's to 200's with rare 300. Mrs. Cat can tell if glucose is high, he becomes\"aggitated.\"          Recent Labs  Lab 12/11/17  1300 12/11/17  1102 12/11/17  0957 12/11/17  0900 12/11/17  0820 12/11/17  0659  12/11/17  0035  12/10/17  0511  12/09/17  0641  12/08/17  1545   GLC  --   --   --   --   --   --   --  91  --  360*  --  161*  --  265*   * 119* 134* 126* 114* 108*  < >  --   < >  --   < >  --   < >  --    < > = values in this interval not displayed.      Diabetes Type:   diabetes, dx after exposure to agent orange  Diabetes Duration: dx in " "1980's  Usual Diabetes Regimen:  units twice daily,   Novolog 20-40 units per meal, depending on what he eats  Ability to [place] Prescribed Regimen: yes, before stroke  Diabetes Control:   Lab Results   Component Value Date    A1C 7.3 12/08/2017     Diabetes Complications: neuropathy and retinopathy  Able to Detect Hypoglycemia: yes per wife, \"white light\" in his eyes, slow moving  Usual Diabetes Care Provider:Dr. Leal, [facility]  Factors Impacting Glucose Control: steroids, diet      Review of Systems  ROS difficult to obtain, sleeping and not easily aroused.    Past medical, family and social histories are reviewed and updated.    Past Medical History  No past medical history on file.    Family History  No family history specific for diabetes    Social History  Social History     Social History     Marital status:      Spouse name: N/A     Number of children: N/A     Years of education: N/A     Social History Main Topics     Smoking status: Not on file     Smokeless tobacco: Not on file     Alcohol use Not on file     Drug use: Not on file     Sexual activity: Not on file     Other Topics Concern     Not on file     Social History Narrative         Physical Exam  /62  Temp 98.6  F (37  C) (Axillary)  Resp 16  Ht 1.778 m (5' 10\")  Wt 124 kg (273 lb 5.9 oz)  SpO2 98%  BMI 39.22 kg/m2    General:  NAD  HEENT: oral mucous membranes moist.   Lungs: unlabored  ABD: obese, soft  Skin: warm and dry, no obvious lesions to exposed areas  MSK: bilateral arms in soft restraints  Mental status: sleeping      Laboratory  Recent Labs   Lab Test  12/11/17   0604  12/11/17   0035  12/10/17   0511   NA   --   144  141   POTASSIUM  4.4  4.0  4.7   CHLORIDE   --   114*  111*   CO2   --   19*  20   ANIONGAP   --   11  10   GLC   --   91  360*   BUN   --   35*  27   CR   --   1.54*  1.25   IZABELLA   --   8.2*  7.7*     CBC RESULTS:   Recent Labs   Lab Test  12/11/17   0604   WBC  9.3   RBC  2.26* "   HGB  7.3*   HCT  22.2*   MCV  98   MCH  32.3   MCHC  32.9   RDW  21.4*   PLT  200       Liver Function Studies -   Recent Labs   Lab Test  12/08/17   1545   PROTTOTAL  6.9   ALBUMIN  3.2*   BILITOTAL  2.8*   ALKPHOS  103   AST  23   ALT  25       Active Medications  Current Facility-Administered Medications   Medication     QUEtiapine (SEROquel) tablet 25 mg     dexmedetomidine (PRECEDEX) 400 mcg in 0.9% sodium chloride 100 mL     potassium chloride 10 mEq in 100 mL intermittent infusion with 10 mg lidocaine     dextrose 10 % 1,000 mL infusion     insulin 1 unit/mL in saline (NovoLIN, HumuLIN Regular) drip - ADULT IV Infusion     glucose 40 % gel 15-30 g    Or     dextrose 50 % injection 25-50 mL    Or     glucagon injection 1 mg     clopidogrel (PLAVIX) tablet 75 mg     lisinopril (PRINIVIL/ZESTRIL) tablet 20 mg     pantoprazole (PROTONIX) EC tablet 40 mg     albuterol (PROAIR HFA/PROVENTIL HFA/VENTOLIN HFA) Inhaler 2 puff     senna-docusate (SENOKOT-S;PERICOLACE) 8.6-50 MG per tablet 2 tablet     polyethylene glycol (MIRALAX/GLYCOLAX) Packet 17 g     0.9% sodium chloride infusion     OLANZapine (zyPREXA) tablet 5 mg     atorvastatin (LIPITOR) tablet 40 mg     aspirin tablet 325 mg     levothyroxine (SYNTHROID/LEVOTHROID) tablet 175 mcg     predniSONE (DELTASONE) tablet 60 mg     Hold: Metformin and metformin containing medications on day of the procedure and for 48 hours after IV contrast given     ondansetron (ZOFRAN-ODT) ODT tab 4 mg    Or     ondansetron (ZOFRAN) injection 4 mg     prochlorperazine (COMPAZINE) injection 5 mg    Or     prochlorperazine (COMPAZINE) tablet 5 mg    Or     prochlorperazine (COMPAZINE) Suppository 12.5 mg     metoclopramide (REGLAN) tablet 5 mg    Or     metoclopramide (REGLAN) injection 5 mg     hydrALAZINE (APRESOLINE) injection 10 mg     labetalol (NORMODYNE/TRANDATE) injection 10 mg     Medication Instruction - NO anti-coagulation or anti-platelet meds for 24 hours after end of  IV alteplase (ACTIVASE) INFUSION [Including: heparin, enoxaparin (LOVENOX), warfarin (COUMADIN), aspirin, NSAIDs, clopidogrel (PLAVIX), dipyridamole (PERSANTINE)].     Medication Instruction: For Pharmacy Assistance Regarding ateplase (ACTIVASE) Administration: [facility]  Page: [phone] - 24 hours per day OR [facility] Page: [phone]     naloxone (NARCAN) injection 0.1-0.4 mg     lidocaine 1 % 0.5-5 mL     lidocaine (LMX4) kit     potassium chloride SA (K-DUR/KLOR-CON M) CR tablet 20-40 mEq     potassium chloride (KLOR-CON) Packet 20-40 mEq     potassium chloride 10 mEq in 100 mL sterile water intermittent infusion (premix)     potassium chloride 10 mEq in 100 mL intermittent infusion with 10 mg lidocaine     magnesium sulfate 4 g in 100 mL sterile water (premade)     potassium phosphate 15 mmol in NaCl 0.9 % 250 mL intermittent infusion     potassium phosphate 20 mmol in NaCl 0.9 % 500 mL intermittent infusion     potassium phosphate 20 mmol in NaCl 0.9 % 250 mL intermittent infusion     potassium phosphate 25 mmol in NaCl 0.9 % 500 mL intermittent infusion     lidocaine 1 % 1 mL     lidocaine (LMX4) kit     sodium chloride (PF) 0.9% PF flush 3 mL     sodium chloride (PF) 0.9% PF flush 3 mL     thiamine (B-1) 300 mg in NaCl 0.9 % 50 mL intermittent infusion     No current outpatient prescriptions on file.       Current Diet    Active Diet Order      Combination Diet 7906-6423 Calories: Moderate Consistent CHO (4-6 CHO units/meal); Nectar Thickened Liquids (pre-thickened or use instant food thickener); Dysphagia Diet Level 2: Mechan Altered; Nectar Thickened Liquids (pre-thickened or use insta...      Assessment: Seven Cat is a 72 year old male with history of  Diabetes, hypertension, hyperlipidemia, Hashimoto's thyroiditis,autoimmune hemolytic anemia ( blood transfusion for hemolysis), presented from [facility] after receiving tPA for left MCA stroke (12/9/2017).     Diabetic: a1C 7.3%  (12/8/2017), value may not be accurate if receiving frequent blood  transfusion   Plan  -will continue on IV insulin for now ( original plan was to transition off IV insulin, but there is a difference in basal insulin requirements taking or not taking Prednisone ). Morning dose was documented as refused  - sent text page to primary team regarding Prednisone  -add meal insulin at 1 unit per 5 grams of CHO with meals and snacks/supplements ( if taking prednisone)    Will continue to follow    Giovanna Mckay, [phone]    Diabetes Management Team job code: [id number]

## 2017-12-11 NOTE — CONSULTS
"Social Work: Assessment with Discharge Plan    Patient Name:  Seven Cat  :  1945  Age:  72 year old  MRN:  [id number]  Risk/Complexity Score:     Completed assessment with:  Pt's wife, chart review, attended rounds     Presenting Information   Reason for Referral:  Financial issues and Stroke consult  Date of Intake:  2017  Referral Source:  Consult  Decision Maker:  Patient   Alternate Decision Maker: Per [facility] [employer] policy, pt's wife (Radha Cat) would be alternate decision maker.   Health Care Directive:  Consult  Living Situation:  House  Previous Functional Status:  Pt's wife   Patient and family understanding of hospitalization: Restorative.   Cultural/Language/Spiritual Considerations:  English-speaking.   Adjustment to Illness:  Pt was asleep and did not participate in assessment. Per wife, pt has been \"resistent\" and \"stubborn\" during hospitalization.     Physical Health  Reason for Admission:  No diagnosis found.  Services Needed/Recommended:  TBD, pending hospital course, therapy recommendations.     Mental Health/Chemical Dependency  Diagnosis:  None identified.   Support/Services in Place:  NA   Services Needed/Recommended:  NA     Support System  Significant relationship at present time:  Wife   Family of origin is available for support:  Pt has two daughters (one [facility], one in [place] who is an RN and helping to motivate pt, per wife's report).   Other support available:  None identified.   Gaps in support system:  None identified at this time.   Patient is caregiver to:  None     Provider Information   Primary Care Physician:  Jeff Pinto   [phone]   Clinic:  [facility] [id number]      :  [facility] [place] [phone]    Financial   Income Source:  Pt's wife reported that pt is 100% disabled and 100% service connected through the [facility].   Financial Concerns:  Beyond ensuring coverage for hospital and potential rehab, no " financial concerns were identified.   Insurance:    Payor/Plan Subscriber Name Rel Member # Group #   COMMERCIAL - [facility] MED[facility]* ROSA NEWMAN  [id number]       17A2 FEE BASIS, [address]       Discharge Plan   Patient and family discharge goal:   Provided education on discharge plan:  YES, to wife.   Patient agreeable to discharge plan:  D/c plan unclear at this point and have not yet discussed with pt.   A list of Medicare Certified Facilities was provided to the patient and/or family to encourage patient choice. Patient's choices for facility are:  Discussed different levels of services/rehab (home care through ARU), but did not provide list yet, as plan is unclear and wife was overwhelmed.   Barriers to discharge:  Medical stability     Discharge Recommendations   Anticipated Disposition:  TBD, pending hospital course and therapy recommendations.  Transportation Needs:  TBD   Name of Transportation Company and Phone:  TBD     Additional comments   Met with pt's wife at the bedside while pt slept. Introduced self and role of SW. Obtained information above. Explained different levels of services/rehab that could be recommended at d/c. Agreed to be in touch about d/c plan, as another provider arrived to meet with them. Provided SW contact info for follow up. Pt's wife thanked SW for stopping by.     Delma Castaneda, LEODAN, [facility]  ICU Float   Pager: [phone]  [email or web address]     NO LETTER

## 2017-12-11 NOTE — PROGRESS NOTES
CLINICAL NUTRITION SERVICES - ASSESSMENT NOTE     Nutrition Prescription    RECOMMENDATIONS FOR MDs/PROVIDERS TO ORDER:  - Diet per SLP.   - Binh counts or nutrition support if eating poorly.  - If planning on long term prednisone use, recommend check vitamin D level.   - Continue to optimized BG control with goal of 150-180 mg/dL consistently.  - Continue IVF while on thickened liquids (PO hydration may be difficult).    Malnutrition Status:     Unable to determine.    Recommendations already ordered by Registered Dietitian (RD):  - Magic cups TID.     Future/Additional Recommendations:  - Adjust supplements as needed pending changes in diet and tolerance.    If FT/TF therapy is required, recommend the following initial regimen:   - Isosource 1.5 @ goal 55 ml/hr + 4 packets of Prosource TF daily  = (1320 ml/day) to provide 2120 kcals (24 kcal/kg/day), 145 g PRO (1.6 g/kg/day), 1003 ml free H2O, 232 g CHO and 20 g Fiber daily.  - 30-60 ml q 4 hr water flushes to prevent FT from clogging.   - Liquid multivitamin (Certavite 15 mL/day).         REASON FOR ASSESSMENT  Seven Cat is a/an 72 year old male assessed by the dietitian for Admission Nutrition Risk Screen for new/uncontrolled diabetes    NUTRITION HISTORY  Unable to obtain meaningful nutrition hx from pt at this time secondary to confusion/aphagia.    CURRENT NUTRITION ORDERS  Diet: Moderate Consistent Carbohydrate, Dysphagia Level 2:  Mechanically Altered and Nectar Thickened Liquids.  Intake/Tolerance: Per I/O record, eating 25-75% (mostly 75%) of meals thus far. Pt ordered 3 full meals yesterday. He is requiring assistance with eating and RN noting appetite as fair or good. Pt also reported with abd pain d/t constipation, which may be affecting intake.    LABS  Labs reviewed  HgA1C: 7.3 (H)  BUN: 34 (H)  Creatinine: 1.54 (H)    MEDICATIONS  Medications reviewed  Senokot  Miralax   Prednisone  Insulin gtt  Thiamine  IVF @ 50  "ml/hr    ANTHROPOMETRICS  Height: 177.8 cm (5' 10\")  Most Recent Weight: 124 kg (273 lb 5.9 oz)    IBW: 75.5 kg  BMI: Obesity Grade II BMI 35-39.9  Weight History: No wt hx available to evaluate wt trends.  Wt Readings from Last 10 Encounters:   12/08/17 124 kg (273 lb 5.9 oz)       Dosing Weight: 88 kg (adjusted) - based on driest wt this admission of 124 kg (12/8) and IBW of 75.5 kg    ASSESSED NUTRITION NEEDS  Estimated Energy Needs: 5296-8225 kcals/day (20 - 25 kcals/kg)  Justification: Overweight  Estimated Protein Needs: 132+ grams protein/day (1.5+ grams of pro/kg)  Justification: Hypercatabolism with acute illness and obesity guidelines  Estimated Fluid Needs: 6872-6631 mL/day (1 mL/kcal)   Justification: Maintenance    PHYSICAL FINDINGS  See malnutrition section below.    MALNUTRITION  % Intake: Unable to assess  % Weight Loss: None noted per limited wt data  Subcutaneous Fat Loss: None observed (per visual assesment just outside pt's room)  Muscle Loss: Unable to assess  Fluid Accumulation/Edema: None noted per chart  Malnutrition Diagnosis: Unable to determine due to unable to obtain a meaningful nutrition hx and physical assessment    NUTRITION DIAGNOSIS  Predicted inadequate nutrient intake (protein-calories) related to possible that dysphagia and constipation sx may continue to hinder adequate intake as evidenced by eating with a fair-good appetite (25-75% of meals thus far)       INTERVENTIONS  Implementation  Nutrition Education: Not appropriate at this time due to patient condition   Medical food supplement therapy     Goals  Patient to consume % of nutritionally adequate meal trays TID, or the equivalent with supplements/snacks.     Monitoring/Evaluation  Progress toward goals will be monitored and evaluated per protocol.    Jas Gonzales RD, LD  Neuro Pager: [phone]  "

## 2017-12-11 NOTE — PROGRESS NOTES
[facility]  NeuroICU Daily ICU Note:       Chief complaint   Seven Cat is a 72 year old man with Lt MCA stroke, s/p Stent; postoperative day 2. Past medical history significant for Autoimmune hemolytic anemia, Hashimoto's thyroiditis, DM, Hypertension, and hyperlipidemia.           Subjective (24 hours events)     Patient was agitated and aggressive last night, pulling out IV lines and difficult to redirect.  He was given Zyprexa IM, followed later by Precedex to calm him down. He also had shortness of breath (Hb 6.7) and episodes of hypotension (70-90s/40-50s); was transfused 1 unit of Packed RBCs and given 500cc Bolus NS.    Currently, no new complaints;          Objective   Temp:  [98.5  F (36.9  C)-100.6  F (38.1  C)] 98.5  F (36.9  C)  Heart Rate:  [] 79  Resp:  [0-40] 8  BP: ()/() 110/53  SpO2:  [90 %-100 %] 95 %    No Data Recorded    Resp: 8    I/O last 3 completed shifts:  In: 2709.79 [P.O.:420; I.V.:1489.79; IV Piggyback:500]  Out: 1300 [Urine:1100; Stool:200]    Physical Exam  General: lying comfortably, no apparent distress; drowsy  Neurologic    Mental Status:       -- Awake; Alert; oriented to person and place      -- Follows commands       --Expressive aphasia, able to comprehend commands      -- no gaze preference. No apparent hemineglect.    Cranial Nerves:      -- visual fields full to confrontation, pupils 1mm bilaterally, sluggish response to light      -- extraocular movements intact      -- face symmetrical, tongue midline      -- sensory V1-V3 intact bilaterally      -- palate elevates symmetrically, uvula midline      -- hearing grossly intact bilat    Motor:     Bulk and tone normal; no atrophy or twitching    Speed and dexterity could not be tested    Strength:   Delt Bi Tri WE WF    R 5 5 5 5 5 4-   L 5 5 5 5 5 5    IP Quad Ham DF PF EHL   R 5 4- 4- 5 5 5   L 5 5 5 5 5 5     Sensory: symmetrically intact to light touch x4  extremities.     Reflexes: 2+ bilaterally and symmetric in biceps, triceps, brachioradialis, and patellae; no ankle clonus bilaterally; negative Babinski bilaterally; negative Jalloh's bilaterally      Labs and Imaging   Significant labs - Hb increased to 7.3;     BMP  Recent Labs  Lab 12/11/17  0604 12/11/17  0035 12/10/17  0511 12/09/17  0641 12/08/17  1545   NA  --  144 141 141 136   POTASSIUM 4.4 4.0 4.7 4.5 4.6   CHLORIDE  --  114* 111* 110* 106   CO2  --  19* 20 22 21   BUN  --  35* 27 26 26   CR  --  1.54* 1.25 1.17 1.13   IZABELLA  --  8.2* 7.7* 8.9 8.8       CBC  Recent Labs  Lab 12/11/17  0604 12/11/17  0035 12/10/17  1627 12/10/17  0511   WBC 9.3 9.4 8.5 7.2   HGB 7.3* 6.7* 7.4* 7.0*    251 261 237       COAGS  Recent Labs  Lab 12/11/17  0035 12/09/17  0641 12/08/17  1545   INR 1.31* 1.23* 1.25*   PTT 29  --  29   FIBR  --   --  364       ABG  Recent Labs  Lab 12/10/17  1616   PH 7.42   PCO2 26*   PO2 105   HCO3 17*       CRP/ESRNo results for input(s): CRP in the last 168 hours.    Invalid input(s): ESR    CSFNo results for input(s): CGLU, CTP in the last 168 hours.    Invalid input(s): CCSF    MICRO  Recent Labs  Lab 12/11/17  0138 12/11/17  0130   CULT No growth after 4 hours Canceled, Test creditedCanceled via EPIC interface       IMAGING:   reviewed          Assessment and Plan   1. Neuro: LT MCA distribution stroke with LT ICA stenosis S/P Stenting on 12/9    Current Deficit: expressive aphasia    Agitation:  likely due to extreme frustration at being unable to communicate; or ICU delirium given his waxing/waning symptoms of disorientation and agitation; no indications for continued infection. Continue oral Seroqel.    Continue frequent neuro exams while in ICU. Notify MD for acute changes in exam.    Continue frequent neuro checks in ICU; Notify MD for acute changes    Continue ASA and Plavix;    Continue atorva 40mg    2. CVS:     ECG sinus rhythm     Hypotensive episodes: like due to recent  stenting (causing anel receptor dysreflexia)    Maintain SBP < 120    Hydralazine and labetolol PRN    Continuous cardiac monitoring while in ICU  3. Pulmonary: no issues    Continuous pulse oximetry    Supplemental oxygen PRN    Albuterol PRN  4. GI:     Dysphagia Diet Level 2 with nectar thickened liquids    Bowel regimen. PRN anti-emetics.    Protonix for ulcer ppx    5. Renal: no issues    NS 25cc/hr    Electrolyte replacement protocol;     Continue to monitor intake/output    Daily BMP  6. ID: afebrile, normal WBC count    Continue to monitor for fevers and/or signs of infection    7. Endocrine: DM neuropathy & retinopathy    Patient currently on insulin drip. Patient's outside records indicate that he has been taking 120u of NPH BID. Given complexity of this situation, we consulted endocrinology for assistance in DM1 management.    Hashimoto's thyroiditis - contiue Levothyroxine 175mcg before breakfast    8. Heme: Autoimmune hemolytic anemia    Platelets > 100,000    INR < 1.5    Hemoglobin > 7    Daily CBC    Transfused 1u pRBC's overnight 12/10-12/11. Patient with improved SOB. Continue to monitor for additional transfusions.    9. Prophylaxis    DVT: SCDs while in bed    GI: Protonix  10. Disposition: Neuro ICU    PT/OT    This note was completed by Bee Baker, MS-3; Scribing for MD Nicolas Schaeffer MD  Neurology resident, PGY-2  (P) [phone]

## 2017-12-11 NOTE — PROGRESS NOTES
[facility]  NeuroICU Daily ICU Note:          Assessment   Seven Cat is a 72 year old male with signs of LT MCA stroke s/p TPA day 1 with chronic Critical More than 90% LT  ICA stenosis.          Subjective (24 hours events)   The patient loaded with ASA & plavix & went to IR & Got stenting          Plan 1.   Neuro: LT MCA distribution stroke with LT ICA stenosis S/P Stenting on 12/9    Continue frequent neuro exams while in ICU. Notify MD for acute changes in exam.     continue on ASA & plavix    Start atorvastatin 40 mg    Thiamine     Get CT head today   2. CVS: HTN & HLD    heamodynamically stable    ECG sinus rhythm with normal ECG    Echo : EF 55-60% No PFO, Normal atria    Maintain SBP < 120    On nicardine drip     Started on lisinoopril 20     Hydralazine and labetolol PRN    Continuous cardiac monitoring while in ICU  3. Pulmonary:     Continuous pulse oximetry    The patient is agitated with shortness of breath, normal SAturation & ABG shows hypervemtilation     Supplemental oxygen PRN    Incentive spirometry Q1H while awake    Daily chest X ray as needed     Has expiratory wheeze , started on albutarol PRN  4. GI:     Evaluated by SLP who recommend dysphagia diet level 2 with nectar thick liquids.    Bowel regimen. PRN anti-emetics.    Protonix for ulcer ppx  5. Renal: no issues    NS 50 ml/hr    Electrolyte replacement protocol    Continue to monitor intake/output    Daily BMP    History of BPH  6. ID: afebrile, normal WBC count    Continue to monitor for fevers and/or signs of infection  7. Endocrine: DM with neuropathy & retinopathy    Continue glucose checks    Continue sliding scale insulin    History of Hashimoto's thyroididits coninue levothyroxine 175 mcq before breakfast   8. Heme: Autoimmune Heamolytic aneamia     Consult haematology & appreciate their recs>> start prednisolone 60 mg BID    Platelets > 100,000    INR < 1.5    Hemoglobin > 7    Daily  CBC    Will transfuse 1 unit of Blood for low HB 7.4 to improve oxygenation ( discussed with the [facility] onc team )  9. Prophylaxis    DVT: SCDs while in bed    GI: Protonix  10. Disposition: Neuro ICU    PT/OT    Above patient and plan has been discussed with the neuro ICU Attending.                Objective   Temp:  [98.2  F (36.8  C)-99.8  F (37.7  C)] 99.8  F (37.7  C)  Heart Rate:  [] 84  Resp:  [0-33] 33  BP: ()/(32-90) 119/68  SpO2:  [95 %-100 %] 96 %    No Data Recorded    Resp: 33    I/O last 3 completed shifts:  In: 1594.42 [P.O.:370; I.V.:1224.42]  Out: 1030 [Urine:1030]    Physical Exam  General: NAD, lying comfortably in bed  Neurologic    Mental Status:       -- Awake; Alert; oriented x 3      -- Follows commands       -- Patient has expressive aphasia .      -- no gaze preference. No apparent hemineglect.    Cranial Nerves:      -- visual fields full to confrontation, PERRL 3-2mm bilat and brisk      -- extraocular movements intact      -- face symmetrical, tongue midline      -- sensory V1-V3 intact bilaterally      -- palate elevates symmetrically, uvula midline      -- hearing grossly intact bilat    Motor:    Delt Bi Tri WE WF    R 5 5 5 5 5 5   L 5 5 5 5 5 5    IP Quad Ham DF PF EHL   R 5 5 5 5 5 5   L 5 5 5 5 5 5     Sensory: symmetrically intact to light touch x4 extremities.     Reflexes: 2+ bilaterally and symmetric in biceps, triceps, brachioradialis, and patellae; no ankle clonus bilaterally; negative Babinski bilaterally; negative Jalloh's bilaterally      Labs and Imaging   BMP    Recent Labs  Lab 12/10/17  0511 12/09/17  0641 12/08/17  1545    141 136   POTASSIUM 4.7 4.5 4.6   CHLORIDE 111* 110* 106   CO2 20 22 21   BUN 27 26 26   CR 1.25 1.17 1.13   IZABELLA 7.7* 8.9 8.8       CBC    Recent Labs  Lab 12/10/17  1627 12/10/17  0511 12/09/17  0641 12/08/17  1545   WBC 8.5 7.2 8.6 8.8   HGB 7.4* 7.0* 8.4* 7.8*    237 229 237       COAGS    Recent Labs  Lab  12/09/17  0641 12/08/17  1545   INR 1.23* 1.25*   PTT  --  29   FIBR  --  364       ABG    Recent Labs  Lab 12/10/17  1616   PH 7.42   PCO2 26*   PO2 105   HCO3 17*       CRP/ESRNo results for input(s): CRP in the last 168 hours.    Invalid input(s): ESR    CSFNo results for input(s): CGLU, CTP in the last 168 hours.    Invalid input(s): CCSF    MICRONo results for input(s): CULT in the last 168 hours.    IMAGING:   reviewed

## 2017-12-11 NOTE — PROGRESS NOTES
"ESN Progress Note    12/11/2017    24 hour events:  Slight drop in HgB (7.4 to 6.7); s/p 1U PRBC transfusion    Current Medications:    QUEtiapine  25 mg Oral BID     clopidogrel  75 mg Oral or Feeding Tube Daily     lisinopril  20 mg Oral or Feeding Tube Daily     pantoprazole  40 mg Oral QAM     senna-docusate  2 tablet Oral BID     polyethylene glycol  17 g Oral Daily     atorvastatin  40 mg Oral or Feeding Tube Daily     aspirin  325 mg Oral or Feeding Tube Daily     levothyroxine  175 mcg Oral or Feeding Tube QAM AC     predniSONE  60 mg Oral BID w/meals     sodium chloride (PF)  3 mL Intracatheter Q8H     thiamine  300 mg Intravenous TID     Infusions:    dexmedetomidine Stopped (12/11/17 0900)     IV fluid REPLACEMENT ONLY       insulin (regular) 2 Units/hr (12/11/17 0456)     niCARdipine (CARDENE) infusion ADULT/PEDS GREATER than or EQUAL to 45 kg max conc Stopped (12/10/17 2200)     NaCl 50 mL/hr at 12/11/17 0400     - MEDICATION INSTRUCTIONS -       - MEDICATION INSTRUCTIONS -       Physical Examination:  /53  Temp 98.5  F (36.9  C) (Axillary)  Resp 8  Ht 1.778 m (5' 10\")  Wt 124 kg (273 lb 5.9 oz)  SpO2 95%  BMI 39.22 kg/m2  MENTAL STATUS:  Alert. Severe expressive aphasia, follows command.     CRANIAL NERVES: Pupils are equal, round, reactive to light, subtle right facial droop.  Extraocular movements full.  Visual fields full.  Facial sensation, movement normal.  Palate moves symmetrically.  Tongue midline.  Sternocleidomastoid and trapezius strength intact.  Neck strength was normal.  No bruits.   MOTOR:  Strength 5/5 throughout   COORDINATION: FTN intact B/L  SENSATIONS: Intact B/L to light touch    Imaging:  Reviewed    Assessment/Plan  Seven Cat is a 72 year old h/o admitted 12/8/2017 with Left MCA territory strokes due to symptomatic Left ICA disease S/P Stenting.     - Stable clinical exam at current blood pressure goal  - Continue BP goal <120  - Daily ASA 325mg and Plavix " 75mg  - Groin site intact    ESN will continue to follow    Plan discussed with Dr. Aishwarya Alexandra Male  ESN fellow

## 2017-12-11 NOTE — PLAN OF CARE
Problem: Patient Care Overview  Goal: Plan of Care/Patient Progress Review  Outcome: Improving  Neuro: Alert, R facial drop w/ RUE weakness (unchanged.0 Expressive aphasia, able to answer simple yes/no questions. Follows simple commands appropriately. Rotates between periods of resltessnes and agitation/impulsiveness. Sitter remains at bedside.   Cardiac: Normal sinus rhythm, nicardpine drip concentration increased, now at 2.5 mg/hr to maintain SBP <120. Reports of chest pain x1, 12 Lead and troponin negative.   Resp: On room air, lung sounds clear, diminished in bases.  GI: Abdomen distended, passing gas, no BM. Pt reporting pain d/t constipation, stool softeners ordered.  : Voiding spont in bedside urinal.  Skin: Left arm infiltration site improving.   Muscle/Mobility: Up w/ assist of 1-2. Up in chair majority of day. Refused walking outside of room with OT or nursing staff.   Pain: Chest pain resolved w/ ice pack, otherwise denies pain.   Lines/Drains: L PIV x1, R PIV x1.    PLAN: Head CT this evening, 1 unit of PRBC upon return from CT. Continue w/ POC.

## 2017-12-11 NOTE — PLAN OF CARE
Problem: Patient Care Overview  Goal: Plan of Care/Patient Progress Review  Discharge Planner SLP   Patient plan for discharge: pt unable to stated  Current status: Pt frequently refused to participate in session despite max cues/redirection. Pt accepted limited number of sips of nectar-thick liquid via cup. Throat clearing and occasional cough noted after swallow; however, unclear if related to swallow vs. baseline cough. Recommend continue dysphagia diet level 2 with nectar-thick liquids with supervision/assist. Pt to sit upright for all PO intake and take small bites/sips. Please hold PO if overt s/sx of aspiration. Ongoing significant aphasia. Pt occasionally answered yes/no questions. Recommend cognitive-linguistic eval when pt willing to participate.   Barriers to return to prior living situation: agitation, cognitive status, dysphagia  Recommendations for discharge: Rehab with ongoing SLP services  Rationale for recommendations: dysphagia, aphasia, suspected cognitive deficits       Entered by: Gisela Adorno 12/11/2017 11:03 AM

## 2017-12-11 NOTE — PLAN OF CARE
Problem: Patient Care Overview  Goal: Plan of Care/Patient Progress Review  4AB PT- CANCEL. Pt asleep and unable to be awoken on 2 attempts. Will reschedule as able.

## 2017-12-12 ENCOUNTER — APPOINTMENT (OUTPATIENT)
Dept: SPEECH THERAPY | Facility: CLINIC | Age: 72
DRG: 034 | End: 2017-12-12
Attending: PSYCHIATRY & NEUROLOGY
Payer: COMMERCIAL

## 2017-12-12 LAB
ABO + RH BLD: ABNORMAL
ABO + RH BLD: ABNORMAL
ANION GAP SERPL CALCULATED.3IONS-SCNC: 10 MMOL/L (ref 3–14)
BLD GP AB SCN SERPL QL: ABNORMAL
BLD PROD TYP BPU: NORMAL
BLD UNIT ID BPU: 0
BLOOD BANK CMNT PATIENT-IMP: ABNORMAL
BLOOD BANK CMNT PATIENT-IMP: ABNORMAL
BLOOD PRODUCT CODE: NORMAL
BPU ID: NORMAL
BUN SERPL-MCNC: 31 MG/DL (ref 7–30)
CALCIUM SERPL-MCNC: 8.1 MG/DL (ref 8.5–10.1)
CHLORIDE SERPL-SCNC: 118 MMOL/L (ref 94–109)
CO2 SERPL-SCNC: 20 MMOL/L (ref 20–32)
CREAT SERPL-MCNC: 1.17 MG/DL (ref 0.66–1.25)
ERYTHROCYTE [DISTWIDTH] IN BLOOD BY AUTOMATED COUNT: 23.7 % (ref 10–15)
GFR SERPL CREATININE-BSD FRML MDRD: 62 ML/MIN/1.7M2
GLUCOSE BLDC GLUCOMTR-MCNC: 109 MG/DL (ref 70–99)
GLUCOSE BLDC GLUCOMTR-MCNC: 114 MG/DL (ref 70–99)
GLUCOSE BLDC GLUCOMTR-MCNC: 116 MG/DL (ref 70–99)
GLUCOSE BLDC GLUCOMTR-MCNC: 122 MG/DL (ref 70–99)
GLUCOSE BLDC GLUCOMTR-MCNC: 126 MG/DL (ref 70–99)
GLUCOSE BLDC GLUCOMTR-MCNC: 135 MG/DL (ref 70–99)
GLUCOSE BLDC GLUCOMTR-MCNC: 138 MG/DL (ref 70–99)
GLUCOSE BLDC GLUCOMTR-MCNC: 147 MG/DL (ref 70–99)
GLUCOSE BLDC GLUCOMTR-MCNC: 150 MG/DL (ref 70–99)
GLUCOSE BLDC GLUCOMTR-MCNC: 155 MG/DL (ref 70–99)
GLUCOSE BLDC GLUCOMTR-MCNC: 167 MG/DL (ref 70–99)
GLUCOSE BLDC GLUCOMTR-MCNC: 327 MG/DL (ref 70–99)
GLUCOSE BLDC GLUCOMTR-MCNC: 332 MG/DL (ref 70–99)
GLUCOSE BLDC GLUCOMTR-MCNC: 99 MG/DL (ref 70–99)
GLUCOSE SERPL-MCNC: 146 MG/DL (ref 70–99)
HCT VFR BLD AUTO: 25.6 % (ref 40–53)
HGB BLD-MCNC: 8.3 G/DL (ref 13.3–17.7)
MCH RBC QN AUTO: 32.8 PG (ref 26.5–33)
MCHC RBC AUTO-ENTMCNC: 32.4 G/DL (ref 31.5–36.5)
MCV RBC AUTO: 101 FL (ref 78–100)
PLATELET # BLD AUTO: 189 10E9/L (ref 150–450)
POTASSIUM SERPL-SCNC: 4.2 MMOL/L (ref 3.4–5.3)
RBC # BLD AUTO: 2.53 10E12/L (ref 4.4–5.9)
SODIUM SERPL-SCNC: 148 MMOL/L (ref 133–144)
SPECIMEN EXP DATE BLD: ABNORMAL
TRANSFUSION STATUS PATIENT QL: NORMAL
TRANSFUSION STATUS PATIENT QL: NORMAL
WBC # BLD AUTO: 7.8 10E9/L (ref 4–11)

## 2017-12-12 PROCEDURE — 25000132 ZZH RX MED GY IP 250 OP 250 PS 637: Performed by: STUDENT IN AN ORGANIZED HEALTH CARE EDUCATION/TRAINING PROGRAM

## 2017-12-12 PROCEDURE — 25000125 ZZHC RX 250: Performed by: STUDENT IN AN ORGANIZED HEALTH CARE EDUCATION/TRAINING PROGRAM

## 2017-12-12 PROCEDURE — 40000225 ZZH STATISTIC SLP WARD VISIT: Performed by: SPEECH-LANGUAGE PATHOLOGIST

## 2017-12-12 PROCEDURE — 25000132 ZZH RX MED GY IP 250 OP 250 PS 637: Performed by: PSYCHIATRY & NEUROLOGY

## 2017-12-12 PROCEDURE — 36415 COLL VENOUS BLD VENIPUNCTURE: CPT | Performed by: STUDENT IN AN ORGANIZED HEALTH CARE EDUCATION/TRAINING PROGRAM

## 2017-12-12 PROCEDURE — 40000193 ZZH STATISTIC PT WARD VISIT: Performed by: PHYSICAL THERAPIST

## 2017-12-12 PROCEDURE — 92526 ORAL FUNCTION THERAPY: CPT | Mod: GN | Performed by: SPEECH-LANGUAGE PATHOLOGIST

## 2017-12-12 PROCEDURE — 92523 SPEECH SOUND LANG COMPREHEN: CPT | Mod: GN | Performed by: SPEECH-LANGUAGE PATHOLOGIST

## 2017-12-12 PROCEDURE — 25000131 ZZH RX MED GY IP 250 OP 636 PS 637: Performed by: STUDENT IN AN ORGANIZED HEALTH CARE EDUCATION/TRAINING PROGRAM

## 2017-12-12 PROCEDURE — 92507 TX SP LANG VOICE COMM INDIV: CPT | Mod: GN | Performed by: SPEECH-LANGUAGE PATHOLOGIST

## 2017-12-12 PROCEDURE — 80048 BASIC METABOLIC PNL TOTAL CA: CPT | Performed by: STUDENT IN AN ORGANIZED HEALTH CARE EDUCATION/TRAINING PROGRAM

## 2017-12-12 PROCEDURE — 36415 COLL VENOUS BLD VENIPUNCTURE: CPT | Performed by: INTERNAL MEDICINE

## 2017-12-12 PROCEDURE — 97530 THERAPEUTIC ACTIVITIES: CPT | Mod: GP | Performed by: PHYSICAL THERAPIST

## 2017-12-12 PROCEDURE — 00000146 ZZHCL STATISTIC GLUCOSE BY METER IP

## 2017-12-12 PROCEDURE — 86901 BLOOD TYPING SEROLOGIC RH(D): CPT | Performed by: STUDENT IN AN ORGANIZED HEALTH CARE EDUCATION/TRAINING PROGRAM

## 2017-12-12 PROCEDURE — 25000128 H RX IP 250 OP 636: Performed by: STUDENT IN AN ORGANIZED HEALTH CARE EDUCATION/TRAINING PROGRAM

## 2017-12-12 PROCEDURE — 86850 RBC ANTIBODY SCREEN: CPT | Performed by: STUDENT IN AN ORGANIZED HEALTH CARE EDUCATION/TRAINING PROGRAM

## 2017-12-12 PROCEDURE — 86900 BLOOD TYPING SEROLOGIC ABO: CPT | Performed by: STUDENT IN AN ORGANIZED HEALTH CARE EDUCATION/TRAINING PROGRAM

## 2017-12-12 PROCEDURE — 85027 COMPLETE CBC AUTOMATED: CPT | Performed by: INTERNAL MEDICINE

## 2017-12-12 PROCEDURE — 20000004 ZZH R&B ICU UMMC

## 2017-12-12 PROCEDURE — 25000131 ZZH RX MED GY IP 250 OP 636 PS 637: Performed by: NURSE PRACTITIONER

## 2017-12-12 PROCEDURE — 97162 PT EVAL MOD COMPLEX 30 MIN: CPT | Mod: GP | Performed by: PHYSICAL THERAPIST

## 2017-12-12 RX ORDER — FOLIC ACID 1 MG/1
1 TABLET ORAL DAILY
Status: DISCONTINUED | OUTPATIENT
Start: 2017-12-12 | End: 2017-12-15 | Stop reason: HOSPADM

## 2017-12-12 RX ORDER — AMLODIPINE BESYLATE 2.5 MG/1
2.5 TABLET ORAL DAILY
Status: DISCONTINUED | OUTPATIENT
Start: 2017-12-12 | End: 2017-12-13

## 2017-12-12 RX ADMIN — QUETIAPINE FUMARATE 25 MG: 25 TABLET ORAL at 21:15

## 2017-12-12 RX ADMIN — POLYETHYLENE GLYCOL 3350 17 G: 17 POWDER, FOR SOLUTION ORAL at 08:05

## 2017-12-12 RX ADMIN — LEVOTHYROXINE SODIUM 175 MCG: 50 TABLET ORAL at 08:05

## 2017-12-12 RX ADMIN — Medication 20 MG: at 16:02

## 2017-12-12 RX ADMIN — PREDNISONE 60 MG: 50 TABLET ORAL at 08:05

## 2017-12-12 RX ADMIN — SENNOSIDES AND DOCUSATE SODIUM 2 TABLET: 8.6; 5 TABLET ORAL at 08:05

## 2017-12-12 RX ADMIN — INSULIN HUMAN 25 UNITS: 100 INJECTION, SUSPENSION SUBCUTANEOUS at 10:09

## 2017-12-12 RX ADMIN — FOLIC ACID 1 MG: 1 TABLET ORAL at 21:16

## 2017-12-12 RX ADMIN — VITAMIN D, TAB 1000IU (100/BT) 1000 UNITS: 25 TAB at 21:15

## 2017-12-12 RX ADMIN — Medication 10 MG: at 06:18

## 2017-12-12 RX ADMIN — Medication 2.5 MG/HR: at 18:17

## 2017-12-12 RX ADMIN — Medication: at 10:08

## 2017-12-12 RX ADMIN — Medication 20 MG: at 07:59

## 2017-12-12 RX ADMIN — AMLODIPINE BESYLATE 2.5 MG: 2.5 TABLET ORAL at 18:03

## 2017-12-12 RX ADMIN — LISINOPRIL 20 MG: 20 TABLET ORAL at 08:05

## 2017-12-12 RX ADMIN — PREDNISONE 60 MG: 50 TABLET ORAL at 18:03

## 2017-12-12 RX ADMIN — HUMAN INSULIN 3 UNITS/HR: 100 INJECTION, SOLUTION SUBCUTANEOUS at 00:04

## 2017-12-12 RX ADMIN — ATORVASTATIN CALCIUM 40 MG: 40 TABLET, FILM COATED ORAL at 08:05

## 2017-12-12 RX ADMIN — QUETIAPINE FUMARATE 25 MG: 25 TABLET ORAL at 08:05

## 2017-12-12 RX ADMIN — ASPIRIN 325 MG ORAL TABLET 325 MG: 325 PILL ORAL at 08:05

## 2017-12-12 RX ADMIN — SENNOSIDES AND DOCUSATE SODIUM 2 TABLET: 8.6; 5 TABLET ORAL at 21:15

## 2017-12-12 RX ADMIN — HYDRALAZINE HYDROCHLORIDE 10 MG: 20 INJECTION INTRAMUSCULAR; INTRAVENOUS at 07:07

## 2017-12-12 RX ADMIN — HYDRALAZINE HYDROCHLORIDE 10 MG: 20 INJECTION INTRAMUSCULAR; INTRAVENOUS at 14:50

## 2017-12-12 RX ADMIN — CLOPIDOGREL 75 MG: 75 TABLET, FILM COATED ORAL at 08:05

## 2017-12-12 RX ADMIN — PANTOPRAZOLE SODIUM 40 MG: 40 TABLET, DELAYED RELEASE ORAL at 08:05

## 2017-12-12 ASSESSMENT — VISUAL ACUITY
OU: NORMAL ACUITY;GLASSES
OU: GLASSES
OU: GLASSES;NORMAL ACUITY
OU: GLASSES
OU: GLASSES;NORMAL ACUITY
OU: GLASSES
OU: GLASSES
OU: NORMAL ACUITY;GLASSES
OU: GLASSES;NORMAL ACUITY
OU: GLASSES;NORMAL ACUITY

## 2017-12-12 NOTE — PLAN OF CARE
Problem: Patient Care Overview  Goal: Plan of Care/Patient Progress Review  Outcome: Improving  D/I/A: Neuro status improving; remains somewhat difficult to arouse however follows commands and moves all extremities. Oriented to self and place. Pupils pinpoint otherwise perrl intact. Patient unable to complete full neuro assessment, continually falling asleep and/or refusing to participate. Speech remains garbled, answers yes/no questions and occasionally able to clearly verbalize small words. Notable right sided facial droop, right sided tongue deviation and right upper extremity weakness - unchanged. HR w/ frequent PVC - MD notified. BP stable. Lungs coarse at times, patient able to clear w/ strong cough, on RA. Voiding, unable to fully empty bladder - MD aware. (-)BM, abd appears distended, hyperactive bowels, (+)flatus. Groin site c/d/i. Pulses palpable in BLE. Poor PO intake, patient refusing all PO food and fluids.  P: Continue to encourage PO intake and offer urinal. Straight cath if unable to void and volume >500. Neuro's Q2hr per MD.

## 2017-12-12 NOTE — PLAN OF CARE
Problem: Patient Care Overview  Goal: Plan of Care/Patient Progress Review  Discharge Planner SLP   Patient plan for discharge: inpt rehab  Current status: Pt seen to f/u for swallowing and complete speech/language evaluation.  Recommend continue with dysphagia diet level 2 w/ nectar thick liquids, pt w/ continued s/sx of aspiration with thin liquid trials and advanced solid textures. Pt completed speech language evaluation. Pt needed coaxing to complete eval as Pt continues to be frustrated by difficulty w/ communication. Pt demonstrates strengths in receptive language w/ ability to follow single step commands and respond to y/n questions; however, pt with severe expressive aphasia w/limited ability to communicate basic wants and needs.  Pt did best with automatic speech tasks.  ST will plan to f/u for diet tolerance and speech/language needs.  ST will attempt picture board tomorrow as appropriate.  Barriers to return to prior living situation: aphasia,dysphagia  Recommendations for discharge: ARU  Rationale for recommendations:Pt will need intense speech/language and swallowing tx at discharge.       Entered by: Sheila Randolph 12/12/2017 3:58 PM

## 2017-12-12 NOTE — PLAN OF CARE
Problem: Patient Care Overview  Goal: Plan of Care/Patient Progress Review  PT 4A- eval and intervention completed.  Discharge Planner PT   Patient plan for discharge: home  Current status: pt has good strength in UE and LE with slight R fascial droop. Pt is able to move in bed and transferred sit to stand from bed and stood for several minutes. Pt took steps bed to chair with use of ww. Pt able to move well. Pt educated in starting to walk at next PT session. Pt with expressive aphasia and he gets frustrated   Barriers to return to prior living situation: pt with cognitive deficits and communication deficits. Pt with decreased ability to do ADLS  Recommendations for discharge: home with assist, OP therapies vs ARU depending on progress  Rationale for recommendations: pt will need ongoing speech for communication, PT for mobility, and OT for ADLS        Entered by: Siomara Anton 12/12/2017 10:33 AM

## 2017-12-12 NOTE — PLAN OF CARE
Problem: Patient Care Overview  Goal: Plan of Care/Patient Progress Review  Outcome: Improving  D/I:  Patient more alert and interactive this shift.  No behavioral outbursts this shift.  Behaviors conducive to removing bilateral upper extremity soft limb restraints.  Sitter continues at bedside for some impulsive behaviors.  Spouse spent the night resting at patient bedside.  Insulin drip continues and titrated per protocol.  Declined diet last evening but ordered breakfast and taking good oral intake.  Voiding spontaneously greater than 1 liter output since midnight.  Incontinent of scant to small amount of liquid stool x1.  Neuro exam frequency changed to every 2 hours overnight to allow patient to get a bit of rest.  Neuro exam unchanged.  Continues with expressive aphasia.  Does not follow commands consistently. Some noted hypertension that was unresolved this AM and treated with labetalol 10 mg IV x1 with no effect and hydralazine 10 mg IV at 0700 for SBP outside goal of <120.  Cardiac monitor is SR with frequent PVC's.  Electrolytes within normals this AM.  Please see flowsheets for vitals and assessments.  A:  Neurologically unchanged this shift.  Hemodynamically stable with SBP >120 goal this AM and treated with PRN anti-hypertensives.   P:  Continue to monitor and treat as ordered.  Offer support as able to patient and family.  Possible transfer to floor today.

## 2017-12-12 NOTE — PROGRESS NOTES
Diabetes Consult Daily  Progress Note          Assessment/Plan:   Seven Cat is a 72 year old male with history of  Diabetes, hypertension, hyperlipidemia, Hashimoto's thyroiditis,autoimmune hemolytic anemia ( blood transfusion for hemolysis), presented from [facility] after receiving tPA for left MCA stroke (12/9/2017).      Diabetic: a1C 7.3% (12/8/2017), value may not be accurate if receiving frequent blood  transfusion   Plan  NPH 25 units AM  NPH 25 units PM  -d/c IV insulin 1 hour after sub-q insuiln  -add meal insulin at 1 unit per 5 grams of CHO with meals and snacks/supplements   -Novolog High correction scale every 4 hours  -test glucose every 4 hours                   Will continue to follow           Interval History:   The last 24 hours progress and nursing notes reviewed.  Transition off IV insulin  Calculated ~ 2.66 units per hour x 24 hours = 63.9 units with 20% reduction = 51 units, will give NPH 25 units twice daily.  Oral intake seems adequate, ate 71 grams for breakfast. Was given 9 units instead of 14 ( per documentation, patient seems fragile),  Has a sitter, now sleeping in the chair. Per nursing, becomes agitated when unable to express himself.    Steroid:  Prednisone 60 mg twice daily      Recent Labs  Lab 12/12/17  0751 12/12/17  0704 12/12/17  0612 12/12/17  0503 12/12/17  0434 12/12/17  0403 12/12/17  0304  12/11/17  0035  12/10/17  0511  12/09/17  0641  12/08/17  1545   GLC  --   --   --   --  146*  --   --   --  91  --  360*  --  161*  --  265*   BGM 99 114* 135* 150*  --  155* 147*  < >  --   < >  --   < >  --   < >  --    < > = values in this interval not displayed.            Review of Systems:   See interval hx          Medications:       Active Diet Order      Combination Diet 9042-9254 Calories: Moderate Consistent CHO (4-6 CHO units/meal); Nectar Thickened Liquids (pre-thickened or use instant food thickener); Dysphagia Diet Level 2: Mechan Altered;  "Nectar Thickened Liquids (pre-thickened or use insta...     Physical Exam:  Gen: NAD, sitting up in chair   HEENT:  mucous membranes are moist  Resp: Unlabored  Neuro: sleeping  /68  Temp 98.1  F (36.7  C) (Axillary)  Resp 13  Ht 1.778 m (5' 10\")  Wt 124.7 kg (274 lb 14.6 oz)  SpO2 97%  BMI 39.45 kg/m2           Data:     Lab Results   Component Value Date    A1C 7.3 12/08/2017              CBC RESULTS:   Recent Labs   Lab Test  12/11/17   1806   WBC  6.9   RBC  2.24*   HGB  7.1*   HCT  21.9*   MCV  98   MCH  31.7   MCHC  32.4   RDW  23.1*   PLT  200     Recent Labs   Lab Test  12/12/17   0434  12/11/17   0604  12/11/17   0035   NA  148*   --   144   POTASSIUM  4.2  4.4  4.0   CHLORIDE  118*   --   114*   CO2  20   --   19*   ANIONGAP  10   --   11   GLC  146*   --   91   BUN  31*   --   35*   CR  1.17   --   1.54*   IZABELLA  8.1*   --   8.2*     Liver Function Studies -   Recent Labs   Lab Test  12/08/17   1545   PROTTOTAL  6.9   ALBUMIN  3.2*   BILITOTAL  2.8*   ALKPHOS  103   AST  23   ALT  25     Lab Results   Component Value Date    INR 1.31 12/11/2017    INR 1.23 12/09/2017    INR 1.25 12/08/2017         I spent a total of 35 minutes bedside and on the inpatient unit managing the glycemic care of Seven Cat. Over 50% of my time on the unit was spent counseling the patient  and/or coordinating care.      Giovanna Mckay [phone]  Diabetes Management job code [id number]            "

## 2017-12-12 NOTE — PROGRESS NOTES
[facility]  NeuroICU Daily ICU Note:         Chief Complaints   Seven Cat is a 72 year old man with past medical history significant for autoimmune hemolytic anemia, Hashimoto's thyroiditis, DM, Hypertension, and hyperlipidemia presenting with Lt MCA stroke, s/p Stent; postoperative day 3.           Subjective (24 hours events)   No acute events overnight. Groin site dry, no seepage. No new complaints.  Mr. Cat seems to be doing better today; able to smile; respond in monosyllables.             Objective   Temp:  [98.1  F (36.7  C)-99.3  F (37.4  C)] 98.1  F (36.7  C)  Heart Rate:  [60-82] 70  Resp:  [12-26] 13  BP: ()/(52-75) 133/68  SpO2:  [95 %-100 %] 97 %    No Data Recorded    Resp: 13    I/O last 3 completed shifts:  In: 1020.61 [P.O.:40; I.V.:980.61]  Out: 2000 [Urine:2000]    Physical Exam  General: NAD, lying comfortably in bed  Neurologic    Mental Status:       -- Awake; Alert; oriented x 3      -- Follows commands       -- Expressive aphasia, able to comprehend commands      -- no gaze preference. No apparent hemineglect.    Cranial Nerves:      -- visual fields full to confrontation, pupils 1mm; reacting equally to light bilaterally      -- extraocular movements intact      -- face symmetrical, able to raise eyebrows, show teeth, puff out cheeks      -- tongue midline      -- sensory V1-V3 intact bilaterally      -- palate elevates symmetrically, uvula midline      -- strong shoulder shrug      -- hearing grossly intact bilat to conversation    Motor:     Bulk and tone normal; no atrophy or twitching    Speed and dexterity could not be tested    Strength:   Delt Bi Tri WE WF    R 5 5 5 5 5 4-   L 5 5 5 5 5 5    IP Quad Ham DF PF EHL   R 5 4- 4- 5 5 5   L 5 5 5 5 5 5     Sensory: symmetrically intact to light touch x4 extremities.     Reflexes: 2+ bilaterally and symmetric in biceps, triceps, brachioradialis, and patellae; no ankle clonus bilaterally;  negative Babinski bilaterally; negative Jalloh's bilaterally          Labs and Imaging   Significant labs - Hb increased to 8.3;     BMP  Recent Labs  Lab 12/12/17  0434 12/11/17  0604 12/11/17  0035 12/10/17  0511 12/09/17  0641   *  --  144 141 141   POTASSIUM 4.2 4.4 4.0 4.7 4.5   CHLORIDE 118*  --  114* 111* 110*   CO2 20  --  19* 20 22   BUN 31*  --  35* 27 26   CR 1.17  --  1.54* 1.25 1.17   IZABELLA 8.1*  --  8.2* 7.7* 8.9       CBC  Recent Labs  Lab 12/12/17  0847 12/11/17  1806 12/11/17  0604 12/11/17  0035   WBC 7.8 6.9 9.3 9.4   HGB 8.3* 7.1* 7.3* 6.7*    200 200 251       COAGS  Recent Labs  Lab 12/11/17  0035 12/09/17  0641 12/08/17  1545   INR 1.31* 1.23* 1.25*   PTT 29  --  29   FIBR  --   --  364       ABG  Recent Labs  Lab 12/11/17  2051 12/10/17  1616   PH 7.39 7.42   PCO2 32* 26*   PO2 83 105   HCO3 20* 17*       CRP/ESRNo results for input(s): CRP in the last 168 hours.    Invalid input(s): ESR    CSFNo results for input(s): CGLU, CTP in the last 168 hours.    Invalid input(s): CCSF    MICRO  Recent Labs  Lab 12/11/17  0138 12/11/17  0130   CULT No growth after 1 day Canceled, Test creditedCanceled via EPIC interface       IMAGING:   reviewed             Plan   1. Neuro:   #LT MCA distribution stroke with LT ICA stenosis S/P Stenting on 12/9  - Current Deficit: expressive aphasia  - Continue frequent neuro exams while in ICU. Notify MD for acute changes in exam.  - Continue ASA and Plavix;  - Continue atorva 40mg    # Agitation:  likely due to extreme frustration at being unable to communicate; or ICU delirium given his waxing/waning symptoms of disorientation and agitation; no indications for continued infection. Continue oral Seroqel.      2. CVS: hemodynamically stable  - ECG sinus rhythm   - Hypotensive episodes: like due to recent stenting (causing anel receptor dysreflexia)  - Maintain SBP < 120  - Hydralazine and labetolol PRN  - Continuous cardiac monitoring while in ICU    3.  Pulmonary: no issues  - Continuous pulse oximetry  - Supplemental oxygen PRN  - Incentive spirometry Q1H while awake    4. GI:   - Dysphagia Diet Level 2 - nectar thick liquids  - Bowel regimen. PRN anti-emetics.  - Protonix for ulcer ppx    5. Renal: no issues  - Electrolyte replacement protocol;   - Continue to monitor intake/output  - Daily BMP    6. ID: afebrile, normal WBC count  - Continue to monitor for fevers and/or signs of infection    7.  Endocrine: DM neuropathy & retinopathy / Hasimoto's thyroiditis   - Patient seen by endocrinology. Insulin NPH 25 units BID; Sliding scale with carb coverage  -  Hashimoto's thyroiditis - contiue Levothyroxine 175mcg before breakfast    8. Heme: no issues  - Platelets > 100,000  - INR < 1.5  - Hemoglobin > 7  - Daily CBC    9. Prophylaxis  - DVT: SCDs while in bed  - GI: Protonix  - PT/OT    Disposition: Transfer to Inpatient Neurology Floor 6. [facility] or [facility] pending improvement.    This noted is completed by Bee Baker, MS-3, Scribing for     Nicolas Navarro MD  Neurology resident, PGY-2  (P) [phone]

## 2017-12-12 NOTE — PROGRESS NOTES
12/12/17 0911   Quick Adds   Type of Visit Initial PT Evaluation   Living Environment   Lives With spouse   Living Arrangements house  (4 level split)   Home Accessibility ramps present at home;stairs within home;stairs (1 railing present);bed and bath on same level;bed and bath are not on the first floor   Number of Stairs Within Home 5  (5 steps to his living area, doesn't use other stairs in home)   Stair Railings at Home inside, present on right side   Transportation Available family or friend will provide   Living Environment Comment Pt's spouse provided much of the eval information due to pt's severe expressive aphasia; pt's spouse reports they have a sidewalk with ramp to enter home on lower level from patio - this lower level is where they have their main bed/bath and a family room; kitchen/dining room are up 7 stairs; bath on lower level has a walk in tub   Self-Care   Dominant Hand right   Usual Activity Tolerance moderate   Current Activity Tolerance fair   Regular Exercise no   Equipment Currently Used at Home cane, straight;grab bar;walker, rolling;wheelchair, manual  (has step in tub with door)   Activity/Exercise/Self-Care Comment pt able to do ADLS, wife brings food downstairs to him. Pt is now walking w/o AD for short distances only.    Functional Level Prior   Ambulation 0-->independent   Transferring 0-->independent   Toileting 1-->assistive equipment   Bathing 1-->assistive equipment   Dressing 0-->independent   Eating 0-->independent   Communication 0-->understands/communicates without difficulty   Swallowing 0-->swallows foods/liquids without difficulty   Cognition 0 - no cognition issues reported   Fall history within last six months no   Which of the above functional risks had a recent onset or change? cognition;communication/speech;ambulation;transferring;toileting;bathing;dressing;eating;swallowing   Prior Functional Level Comment pt was indep in all ADLS and mobility   General Information    Onset of Illness/Injury or Date of Surgery - Date 12/08/17   Referring Physician Misty Mercedes CNP   Patient/Family Goals Statement wife reports that pt was indep. He is stubborn   Pertinent History of Current Problem (include personal factors and/or comorbidities that impact the POC) Seven Cat is a 72 year old male with past medical history of hemolytic anemia, hypertension, diabetes, hyperlipidemia recent blood transfusion for hemolysis presented from [facility] after receiving tPA for Lt MCA territory related stroke. He was noted to have Left internal carotid stenosis which is likely the underlying cause of stroke. He did have mild worsening of symptoms overnight and new CT/CTA showed left frontal opercular stroke in addition to posterior MCA PCA border zone area stroke   Precautions/Limitations fall precautions   Weight-Bearing Status - LUE full weight-bearing   Weight-Bearing Status - RUE full weight-bearing   Weight-Bearing Status - LLE full weight-bearing   Weight-Bearing Status - RLE full weight-bearing   Heart Disease Risk Factors Age;Gender;Medical history;Overweight;Dislipidemia;Lack of physical activity;High blood pressure;Smoking;Diabetes   General Observations pt annoyed with all the health professionals coming into his room to do things   General Info Comments pt with expressive aphasia   Cognitive Status Examination   Orientation person;place   Level of Consciousness alert   Follows Commands and Answers Questions 50% of the time   Personal Safety and Judgment impulsive;impaired   Memory (not tested)   Cognitive Comment pt with expressive aphasia   Pain Assessment   Patient Currently in Pain No   Posture    Posture Forward head position;Protracted shoulders   Range of Motion (ROM)   ROM Comment UE WFL., LE limited in hamstring mm   Strength   Strength Comments UE WFL with pt able to take good resistance to motions. LE WFL. R fascial droop noted   Bed Mobility   Bed Mobility  "Comments pt able to transfer from long sitting up in bed to sitting at EOB with SBA.   Transfer Skills   Transfer Comments pt transferred sit to stand from bed and transfer bed to chair taking steps using ww   Gait   Gait Comments pt walked 2-3 feet bed to chair withww   Balance   Balance Comments pt stood with ww and took steps with ww. Balance test not done 2nd pt being frustrated with every one in his room   Sensory Examination   Sensory Perception Comments wife reported that pt had bilateral neuropathy below knees bilaterally. Pt reported no numbness, tingling, etc   Muscle Tone   Muscle Tone no deficits were identified   General Therapy Interventions   Planned Therapy Interventions balance training;bed mobility training;gait training;strengthening;transfer training;risk factor education;progressive activity/exercise   Clinical Impression   Criteria for Skilled Therapeutic Intervention yes, treatment indicated   PT Diagnosis impaired functional mobility   Influenced by the following impairments expressive aphasia, fatigue, neuropathy, decreased command following, impaired balance   Functional limitations due to impairments ability to walk and move, ability to perform ADLS, decreased ability to communicate    Clinical Presentation Evolving/Changing   Clinical Presentation Rationale clinical judgement   Clinical Decision Making (Complexity) Moderate complexity   Therapy Frequency` daily   Predicted Duration of Therapy Intervention (days/wks) 7-10 days   Anticipated Discharge Disposition Home with Outpatient Therapy;Home with Assist;Acute Rehabilitation Facility   Risk & Benefits of therapy have been explained Yes   Patient, Family & other staff in agreement with plan of care Yes   [facility] [employer] TM \"6 Clicks\"   2016, Trustees of [facility], under license to [employer].  All rights reserved.   6 Clicks Short Forms Basic Mobility Inpatient Short Form   [facility] AM-PAC  \"6 Clicks\" V.2 " Basic Mobility Inpatient Short Form   1. Turning from your back to your side while in a flat bed without using bedrails? 3 - A Little   2. Moving from lying on your back to sitting on the side of a flat bed without using bedrails? 3 - A Little   3. Moving to and from a bed to a chair (including a wheelchair)? 3 - A Little   4. Standing up from a chair using your arms (e.g., wheelchair, or bedside chair)? 4 - None   5. To walk in hospital room? 3 - A Little   6. Climbing 3-5 steps with a railing? 3 - A Little   Basic Mobility Raw Score (Score out of 24.Lower scores equate to lower levels of function) 19   Total Evaluation Time   Total Evaluation Time (Minutes) 20

## 2017-12-12 NOTE — PLAN OF CARE
Problem: Restraint for Non-Violent/Non-Self-Destructive Behavior  Goal: Prevent/Manage Potential Problems  Maintain safety of patient and others during period of restraint.  Promote psychological and physical wellbeing.  Prevent injury to skin and involved body parts.  Promote nutrition, hydration, and elimination.   Outcome: Improving  Patient not meeting criteria for restraint discontinuation; restraints remain active, patient still attempting to remove PIV's and is impulsive.

## 2017-12-12 NOTE — PROGRESS NOTES
12/12/17 1104   General Information, SLP   Type of Evaluation Speech and Language   Type of Visit Initial   Start of Care Date 12/12/17   Onset of Illness/Injury or Date of Surgery - Date 12/08/17   Referring Physician Melanie   Patient/Family Goals Statement Pt with significant expressive language deficits   Pertinent History of Current Problem Pt is a 72 year old man with past medical history significant for autoimmune hemolytic anemia, Hashimoto's thyroiditis, DM, Hypertension, and hyperlipidemia presenting with Lt MCA stroke, s/p Stent; postoperative day 3.  Pt with ongoing language deficits   Precautions/Limitations swallowing precautions   Oral Motor Sensory Function   Completed on Swallow Evaluation Completed Clinical Bedside Swallow Evaluation   Language: Auditory Comprehension (understanding of spoken language)   One Step Commands (out of 10 total) 9   Y/N Simple Questions (out of 10 total) 8   Two Step Commands (out of 5 total) 0   Comments (Auditory Comprehension) Pt able to comprehend basic information. Increased difficulties noted w/ more complex information   Language: Verbal Expression (use of spoken language to express information)   Tests were administered at the following levels Basic (rote activities)   Automatized Sequences; [place] Diagnostic Aphasia Exam (out of 8 total) 2   Phrase/Sentence Completion (out of 10 total) 2   Responsive Naming; [place] Diagnostic Aphasia Exam 3 (out of 20 total) 0   Comments (Verbal Expression) Pt with severe expressive language deficits, counted 1-10 and completed 5/7 days of the week.  Unable to label objects w/o cues.  Did point to obj out of 2 w/o difficulties.   General Therapy Interventions   Planned Therapy Interventions Language   Language Auditory comprehension;Verbal expression   Clinical Impression, SLP Eval   Criteria for Skilled Therapeutic Interventions Met Yes   SLP Diagnosis Pt completed speech language evaluation. Pt needed coaxing to complete  eval as Pt continues to be frustrated by difficulty w/ communication. Pt demonstrates strengths in receptive language w/ ability to follow single step commands and respond to y/n questions; however, pt with severe expressive aphasia w/limited ability to communicate basic wants and needs.  Pt did best with automatic speech tasks.  ST will plan to f/u for diet tolerance and speech/language needs.  ST will attempt picture board tomorrow as appropriate.   Influenced by the following factors/impairments severity of language impairment   Rehab Potential Fair, will monitor progress closely   Therapy Frequency Daily   Predicted Duration of Therapy Intervention (days/wks) 3 weeks   Anticipated Discharge Disposition Acute Rehabilitation Facility   Risks and Benefits of Treatment have been explained. Yes   Patient, Family & other staff in agreement with plan of care Yes   Total Evaluation Time      Total Evaluation Time (Minutes) 15

## 2017-12-13 ENCOUNTER — APPOINTMENT (OUTPATIENT)
Dept: OCCUPATIONAL THERAPY | Facility: CLINIC | Age: 72
DRG: 034 | End: 2017-12-13
Attending: PSYCHIATRY & NEUROLOGY
Payer: COMMERCIAL

## 2017-12-13 ENCOUNTER — APPOINTMENT (OUTPATIENT)
Dept: SPEECH THERAPY | Facility: CLINIC | Age: 72
DRG: 034 | End: 2017-12-13
Attending: PSYCHIATRY & NEUROLOGY
Payer: COMMERCIAL

## 2017-12-13 LAB
GLUCOSE BLDC GLUCOMTR-MCNC: 209 MG/DL (ref 70–99)
GLUCOSE BLDC GLUCOMTR-MCNC: 223 MG/DL (ref 70–99)
GLUCOSE BLDC GLUCOMTR-MCNC: 265 MG/DL (ref 70–99)
GLUCOSE BLDC GLUCOMTR-MCNC: 280 MG/DL (ref 70–99)
GLUCOSE BLDC GLUCOMTR-MCNC: 281 MG/DL (ref 70–99)
GLUCOSE BLDC GLUCOMTR-MCNC: 302 MG/DL (ref 70–99)
GLUCOSE BLDC GLUCOMTR-MCNC: 309 MG/DL (ref 70–99)

## 2017-12-13 PROCEDURE — 25000132 ZZH RX MED GY IP 250 OP 250 PS 637: Performed by: STUDENT IN AN ORGANIZED HEALTH CARE EDUCATION/TRAINING PROGRAM

## 2017-12-13 PROCEDURE — 25000131 ZZH RX MED GY IP 250 OP 636 PS 637: Performed by: STUDENT IN AN ORGANIZED HEALTH CARE EDUCATION/TRAINING PROGRAM

## 2017-12-13 PROCEDURE — 00000146 ZZHCL STATISTIC GLUCOSE BY METER IP

## 2017-12-13 PROCEDURE — 25000125 ZZHC RX 250: Performed by: STUDENT IN AN ORGANIZED HEALTH CARE EDUCATION/TRAINING PROGRAM

## 2017-12-13 PROCEDURE — 25000128 H RX IP 250 OP 636: Performed by: STUDENT IN AN ORGANIZED HEALTH CARE EDUCATION/TRAINING PROGRAM

## 2017-12-13 PROCEDURE — 40000225 ZZH STATISTIC SLP WARD VISIT: Performed by: SPEECH-LANGUAGE PATHOLOGIST

## 2017-12-13 PROCEDURE — 93010 ELECTROCARDIOGRAM REPORT: CPT | Performed by: INTERNAL MEDICINE

## 2017-12-13 PROCEDURE — 97535 SELF CARE MNGMENT TRAINING: CPT | Mod: GO | Performed by: OCCUPATIONAL THERAPIST

## 2017-12-13 PROCEDURE — 40000133 ZZH STATISTIC OT WARD VISIT: Performed by: OCCUPATIONAL THERAPIST

## 2017-12-13 PROCEDURE — 93005 ELECTROCARDIOGRAM TRACING: CPT

## 2017-12-13 PROCEDURE — 25000128 H RX IP 250 OP 636: Performed by: PSYCHIATRY & NEUROLOGY

## 2017-12-13 PROCEDURE — 20000004 ZZH R&B ICU UMMC

## 2017-12-13 PROCEDURE — 25000132 ZZH RX MED GY IP 250 OP 250 PS 637: Performed by: PSYCHIATRY & NEUROLOGY

## 2017-12-13 PROCEDURE — 92507 TX SP LANG VOICE COMM INDIV: CPT | Mod: GN | Performed by: SPEECH-LANGUAGE PATHOLOGIST

## 2017-12-13 PROCEDURE — 92526 ORAL FUNCTION THERAPY: CPT | Mod: GN | Performed by: SPEECH-LANGUAGE PATHOLOGIST

## 2017-12-13 PROCEDURE — 40000556 ZZH STATISTIC PERIPHERAL IV START W US GUIDANCE

## 2017-12-13 RX ORDER — LISINOPRIL 20 MG/1
20 TABLET ORAL 2 TIMES DAILY
Status: DISCONTINUED | OUTPATIENT
Start: 2017-12-13 | End: 2017-12-15 | Stop reason: HOSPADM

## 2017-12-13 RX ORDER — AMLODIPINE BESYLATE 2.5 MG/1
5 TABLET ORAL DAILY
Status: DISCONTINUED | OUTPATIENT
Start: 2017-12-13 | End: 2017-12-14

## 2017-12-13 RX ORDER — QUETIAPINE FUMARATE 25 MG/1
25 TABLET, FILM COATED ORAL DAILY
Status: DISCONTINUED | OUTPATIENT
Start: 2017-12-14 | End: 2017-12-15 | Stop reason: HOSPADM

## 2017-12-13 RX ORDER — HALOPERIDOL 5 MG/ML
5 INJECTION INTRAMUSCULAR EVERY 6 HOURS PRN
Status: DISCONTINUED | OUTPATIENT
Start: 2017-12-13 | End: 2017-12-15 | Stop reason: HOSPADM

## 2017-12-13 RX ORDER — ATROPINE SULFATE 0.1 MG/ML
INJECTION INTRAVENOUS
Status: DISPENSED
Start: 2017-12-13 | End: 2017-12-13

## 2017-12-13 RX ORDER — QUETIAPINE FUMARATE 50 MG/1
50 TABLET, FILM COATED ORAL EVERY EVENING
Status: DISCONTINUED | OUTPATIENT
Start: 2017-12-13 | End: 2017-12-15 | Stop reason: HOSPADM

## 2017-12-13 RX ADMIN — LEVOTHYROXINE SODIUM 175 MCG: 50 TABLET ORAL at 11:35

## 2017-12-13 RX ADMIN — CLOPIDOGREL 75 MG: 75 TABLET, FILM COATED ORAL at 11:35

## 2017-12-13 RX ADMIN — ASPIRIN 325 MG ORAL TABLET 325 MG: 325 PILL ORAL at 11:36

## 2017-12-13 RX ADMIN — VITAMIN D, TAB 1000IU (100/BT) 1000 UNITS: 25 TAB at 11:35

## 2017-12-13 RX ADMIN — Medication 10 MG: at 13:57

## 2017-12-13 RX ADMIN — POLYETHYLENE GLYCOL 3350 17 G: 17 POWDER, FOR SOLUTION ORAL at 11:39

## 2017-12-13 RX ADMIN — HALOPERIDOL LACTATE 5 MG: 5 INJECTION, SOLUTION INTRAMUSCULAR at 04:25

## 2017-12-13 RX ADMIN — ONDANSETRON 4 MG: 4 TABLET, ORALLY DISINTEGRATING ORAL at 02:33

## 2017-12-13 RX ADMIN — ATORVASTATIN CALCIUM 40 MG: 40 TABLET, FILM COATED ORAL at 11:35

## 2017-12-13 RX ADMIN — HYDRALAZINE HYDROCHLORIDE 10 MG: 20 INJECTION INTRAMUSCULAR; INTRAVENOUS at 03:27

## 2017-12-13 RX ADMIN — SENNOSIDES AND DOCUSATE SODIUM 2 TABLET: 8.6; 5 TABLET ORAL at 20:25

## 2017-12-13 RX ADMIN — Medication 10 MG: at 04:26

## 2017-12-13 RX ADMIN — HYDRALAZINE HYDROCHLORIDE 10 MG: 20 INJECTION INTRAMUSCULAR; INTRAVENOUS at 12:03

## 2017-12-13 RX ADMIN — SENNOSIDES AND DOCUSATE SODIUM 2 TABLET: 8.6; 5 TABLET ORAL at 11:32

## 2017-12-13 RX ADMIN — PREDNISONE 60 MG: 50 TABLET ORAL at 17:23

## 2017-12-13 RX ADMIN — LISINOPRIL 20 MG: 20 TABLET ORAL at 11:38

## 2017-12-13 RX ADMIN — LISINOPRIL 20 MG: 20 TABLET ORAL at 20:25

## 2017-12-13 RX ADMIN — FOLIC ACID 1 MG: 1 TABLET ORAL at 11:36

## 2017-12-13 RX ADMIN — Medication 10 MG: at 05:12

## 2017-12-13 RX ADMIN — PREDNISONE 60 MG: 50 TABLET ORAL at 11:35

## 2017-12-13 RX ADMIN — AMLODIPINE BESYLATE 5 MG: 2.5 TABLET ORAL at 13:56

## 2017-12-13 RX ADMIN — PANTOPRAZOLE SODIUM 40 MG: 40 TABLET, DELAYED RELEASE ORAL at 11:35

## 2017-12-13 RX ADMIN — QUETIAPINE FUMARATE 25 MG: 25 TABLET ORAL at 09:00

## 2017-12-13 RX ADMIN — QUETIAPINE 50 MG: 50 TABLET ORAL at 17:23

## 2017-12-13 RX ADMIN — Medication 10 MG: at 08:57

## 2017-12-13 ASSESSMENT — VISUAL ACUITY
OU: GLASSES;NORMAL ACUITY
OU: NORMAL ACUITY;GLASSES
OU: GLASSES;NORMAL ACUITY
OU: GLASSES;NORMAL ACUITY
OU: NORMAL ACUITY;GLASSES

## 2017-12-13 NOTE — PLAN OF CARE
Problem: Patient Care Overview  Goal: Plan of Care/Patient Progress Review  Discharge Planner SLP   Patient plan for discharge: [facility]  Current status: Pt continues to demonstrate severe expressive language deficits. Pt given communication picture board (9pics) to improve functional communication and reduce frustrations.  Pt w/ difficulty using alphabet board.  Recommend Pt continue with dysphagia diet level 2 w/ nectar thick liquids.  Pt with ongoing s/sx of aspiration with thin liquids.  Pt should continue to take small sips/bites, be upright for all PO and have adequate rate of intake.  Barriers to return to prior living situation: impairments s/p L MCA; dysphagia; aphasia  Recommendations for discharge:inpt rehab  Rationale for recommendations: pt will need intense speech/language tx and dysphagia tx at discharge due to ongoing deficits.       Entered by: Sheila Randolph 12/13/2017 3:24 PM

## 2017-12-13 NOTE — PROGRESS NOTES
Social Work Services Progress Note    Hospital Day: 6  Date of Initial Social Work Evaluation:  12/11/17  Collaborated with:  Patient/wife, chart review, attended rounds, RN CC Sadie    Data:  Received voicemail from pt's wife wondering about getting a copy of pt's medical records from this hospitalization for their own records. She also wanted to make sure his records from this hospitalization were sent to his [facility].     Met with pt and wife at the bedside to follow up on wife's voicemail. Pt was working with speech at the beginning of SW visit. Provided wife with contact info for medical records/HIM to obtain all records from hospitalization. Discussed how RN CC is working [facility] the [facility] and that when pt transfers, documentation will be sent from the hospital to the [facility] so they have information on pt's hospital course, current meds, treatment plan, etc. Pt's wife wondered when pt would be transferred to the [facility]. Provided update based on RN CC note in the chart, indicating that it didn't sound like this would happen today. Offered to touch base with RN CC to see if she can follow up with pt/wife regarding details of plan. Discussed this further with pt once he was finished working with speech. Pt stated he wanted to remain where he was until a [facility] bed was available. Provided education and encouraged him to be mentally prepared to potentially move floors in this hospital, as per discussion in rounds, pt not in need of ICU bed anymore. Pt/wife expressed understanding.     Provided handoff to RN CC about pt/wife's questions about d/c plan and transferring documentation between facilities. RN CC plans to follow up with pt/wife about both.     Intervention:  Supportive check-in, provided contact info for medical records/HIM, education on discharge plan     Assessment:  Pt was having difficulty speaking, but was able to communicate minimally. Pt's wife was  receptive to SW visit.    Plan:    Anticipated Disposition:  [facility]. RN CC coordinating.    Barriers to d/c plan:  Medical stability, bed availability    Follow Up:  SW will continue to follow, support and assist with ongoing social service and discharge planning needs.     LEODAN Vo, [facility]  ICU Float   Pager: [phone]  [email or web address]     NO LETTER

## 2017-12-13 NOTE — PROGRESS NOTES
Care Coordinator- Discharge Planning     Admission Date/Time:  12/8/2017  Attending MD:  Timbo Bullard*     Data  Date of initial CC assessment:  12/11/17  [facility] being diagnosed with L MCA syndrome  Pt was given tPA and transferred to [facility] for higher level of care.      Assessment  Full assessment completed in previous note.    Per chart review and morning report, pt insulin drip d/c'd this morning and pt can be transfer back to [facility] medical floor.  On 12/11 [facility] has said they don't take insulin to their regular floor and no ICU bed available to take pt. Pt remain agitated and requires sitter.    Coordination of Care  CC called [facility] # [phone] and provided update.  Arianne requested to talk to one of Neuro ICU team.  RNCC provided Misty Mercedes NP contact info.  Arianne and her team communicated with Misty.  Sharon Called back and stated they would like to see pt how he is doing out of ICU for 24 hrs.  Pt has been floor status since yesterday but received Nicardipine last night.  Arianne requested RNCC to call her tomorrow morning, 12/14 and give her update and check bed availability.  RNCC agreed and shared the above info with Neuro ICU team and charge nurse.       Plan  Anticipated Discharge Date:  Tomorrow, 12/14 if remain stable and bed is available at the [facility]  Anticipated Discharge Plan:  Transfer back to the [facility]. RNCC will call [facility] referral specialist tomorrow morning # [phone] to give her update and check bed availability.      Sadie Rodriguez RN, PHN, BSN  4A and 4E/ ICU  Care Coordinator  Phone: [phone]  Pager: [phone]    Addendum:  Visited pt spouse and provided update about the plan.  Pt spouse stated she was planning to go home but wants to know for sure if pt is going to [facility] tomorrow or not.  RNCC informed pt spouse that we still don't know if [facility] can accept him tomorrow or not.  RNCC  informed pt spouse that if she is not in pt room,  RNCC will call her and give her update tomorrow morning after discussing with [facility].  Pt spouse agreed.

## 2017-12-13 NOTE — PLAN OF CARE
Problem: Patient Care Overview  Goal: Plan of Care/Patient Progress Review  Discharge Planner OT   Patient plan for discharge: TCU  Current status: pt continues to have expressive aphasia, ambulating to BR today CGA and able to accurately complete G/H without VC's.   Barriers to return to prior living situation: deconditioning  Recommendations for discharge: TCU with potential to progress to home with outpatient OT/PT from this hospitalization pending progress and LOS.   Rationale for recommendations: pt currently with intermittent cognitive deficits and deconditioning causing potential for further injury if he were to return home at this time. Pt will continue to benefit from skilled OT/PT/SLP to maximize ADLI/safety.        Entered by: Navi Avalos 12/13/2017 5:45 PM

## 2017-12-13 NOTE — PROGRESS NOTES
[facility]  NeuroICU Daily ICU Note:         Chief Complaints   Seven Cat is a 72 year old man with past medical history significant for autoimmune hemolytic anemia, Hashimoto's thyroiditis, DM, Hypertension, and hyperlipidemia presenting with Lt MCA stroke, s/p Stent on 11/9.          Subjective (24 hours events)     Patient had episodes of agitation overnight. Thought to be likely due to continuous monitoring in the ICU and degree of aphasia.          Objective   Temp:  [98.3  F (36.8  C)-99.3  F (37.4  C)] 98.8  F (37.1  C)  Pulse:  [69-75] 74  Heart Rate:  [61-83] 77  Resp:  [0-35] 22  BP: ()/() 132/59  SpO2:  [91 %-99 %] 95 %    No Data Recorded    Resp: 22    I/O last 3 completed shifts:  In: 674.46 [P.O.:540; I.V.:134.46]  Out: 2675 [Urine:2675]    Physical Exam  General: NAD, lying comfortably in bed  Neurologic    Mental Status:       -- Awake; Alert; oriented x 3      -- Follows commands       -- Expressive aphasia, able to comprehend commands      -- no gaze preference. No apparent hemineglect.    Cranial Nerves:      -- visual fields full to confrontation, pupils 1mm; reacting equally to light bilaterally      -- extraocular movements intact      -- face symmetrical, able to raise eyebrows, show teeth, puff out cheeks      -- tongue midline      -- sensory V1-V3 intact bilaterally      -- palate elevates symmetrically, uvula midline      -- strong shoulder shrug      -- hearing grossly intact bilat to conversation    Motor:     Bulk and tone normal; no atrophy or twitching    Speed and dexterity could not be tested    Strength:   Delt Bi Tri WE WF    R 5 5 5 5 5 4-   L 5 5 5 5 5 5    IP Quad Ham DF PF EHL   R 5 4- 4- 5 5 5   L 5 5 5 5 5 5     Sensory: symmetrically intact to light touch x4 extremities.     Reflexes: 2+ bilaterally and symmetric in biceps, triceps, brachioradialis, and patellae; no ankle clonus bilaterally; negative Babinski bilaterally;  negative Jalloh's bilaterally          Labs and Imaging   Significant labs - Hb increased to 8.3;     BMP    Recent Labs  Lab 12/12/17  0434 12/11/17  0604 12/11/17  0035 12/10/17  0511 12/09/17  0641   *  --  144 141 141   POTASSIUM 4.2 4.4 4.0 4.7 4.5   CHLORIDE 118*  --  114* 111* 110*   CO2 20  --  19* 20 22   BUN 31*  --  35* 27 26   CR 1.17  --  1.54* 1.25 1.17   IZABELLA 8.1*  --  8.2* 7.7* 8.9       CBC    Recent Labs  Lab 12/12/17  0847 12/11/17  1806 12/11/17  0604 12/11/17  0035   WBC 7.8 6.9 9.3 9.4   HGB 8.3* 7.1* 7.3* 6.7*    200 200 251       COAGS    Recent Labs  Lab 12/11/17  0035 12/09/17  0641 12/08/17  1545   INR 1.31* 1.23* 1.25*   PTT 29  --  29   FIBR  --   --  364       ABG    Recent Labs  Lab 12/11/17  2051 12/10/17  1616   PH 7.39 7.42   PCO2 32* 26*   PO2 83 105   HCO3 20* 17*       CRP/ESRNo results for input(s): CRP in the last 168 hours.    Invalid input(s): ESR    CSFNo results for input(s): CGLU, CTP in the last 168 hours.    Invalid input(s): CCSF    MICRO    Recent Labs  Lab 12/11/17  0138 12/11/17  0130   CULT No growth after 2 days Canceled, Test creditedCanceled via EPIC interface       IMAGING:   reviewed             Plan   1. Neuro:   #LT MCA distribution stroke with LT ICA stenosis S/P Stenting on 12/9  - BP Goal <120  - Continue ASA and Plavix  - Continue atorva 40mg    # Agitation: Likely multifactorial due to expressive aphasia and continuous monitoring in ICU.  -Continue seroquel 25mg qam  -Increase to seroquel 50mg qpm      2. CVS: hemodynamically stable  - ECG sinus rhythm   - Hypotensive episodes: like due to recent stenting (causing anel receptor dysreflexia)  - Maintain SBP < 120  - Hydralazine and labetolol PRN  - Lisinopril 20mg BID (increased to BID on 12/13)  - Added amlodipine 5mg qday  - Continuous cardiac monitoring while in ICU    3. Pulmonary: no issues  - Continuous pulse oximetry  - Supplemental oxygen PRN  - Incentive spirometry Q1H while  awake    4. GI:   - Dysphagia Diet Level 2 - nectar thick liquids  - Bowel regimen. PRN anti-emetics.  - Protonix for ulcer ppx    5. Renal: no issues  - Electrolyte replacement protocol;   - Continue to monitor intake/output  - Daily BMP    6. ID: afebrile, normal WBC count  - Continue to monitor for fevers and/or signs of infection    7.  Endocrine: DM neuropathy & retinopathy / Hasimoto's thyroiditis   - Endocrinology following, appreciate recs. Currently off of insulin drip and on NPH insulin BID with SSI.    8. Heme: no issues  - Platelets > 100,000  - INR < 1.5  - Hemoglobin > 7  - Daily CBC    9. Prophylaxis  - DVT: SCDs while in bed  - GI: Protonix  - PT/OT    Disposition: Transfer back to [facility] for ongoing care.    Nicolas Navarro MD  Neurology resident, PGY-2  (P) [phone]

## 2017-12-13 NOTE — PROVIDER NOTIFICATION
Notified Neuro crit at 0350 that pt is refusing all assessments and interventions- Pt appears to be calm when no staff is in room. Will attempt to administer labetalol again. Sitter available to assist pt when pt is willing.

## 2017-12-13 NOTE — PROGRESS NOTES
Staff assist and code 21 called at 0000 for increasing agitation that escalated to pt getting out of bed on his own, pulling off monitoring lines, and refusal of staff help. At 2330, pt began to pick at lines. Reoriented, bed alarm continued, and lines hid beneath blankets. At 2340, pt removed one peripheral IV from lower forearm. By 0000, pt was kneeling in bed, pulled off monitoring lines (BP cuff, ECG leads, pulse oximeter), and was yelling. Wife believed pt needed to use the urinal so a urinal was brought to pt, though he continued to be agitated so he got out of bed, tugging on remaining peripheral IV that was connected for nicardipine use. By this time, there were four nurses present due to bed alarm sounding, but pt refused help, getting out of bed and ambulating in room. Staff assist was called in order to increase staff presence and to call a code 21 in order to prevent harm to patient or staff. Pt was helped to the bathroom and upon sitting on the toiled the code 21 was cancelled because it was believe that the main problem had been resolved. After patient had used the toilet he required assistance of three and a wheelchair to return to bed. Writer spoke to neuro resident and he was made aware of the situation. A sitter was assigned to the room.    Pt was returned to bed and reassessed. He refused to participate in a neurologic assessment, but vitals were done and other assessment complete. Pt continued to be agitated. It was found that remaining PIV was leaking and had been partially dislodged. A vascular access order was placed. IV access was needed for BP meds, but BP was stable so when patient refused a new PIV placement, vascular access was asked to leave and return shortly. In the meantime, pt's BP began to rise, MD notified. Circulating RN fixed current PIV but before being able to push hydralazine pt removed that line as well.     DANNIELLE haldol ordered for prn use to place new IV. Charge nurse, 2  circulating nurses, CRRT resource nurse, and writer present to deescalate pt during IV placement. Pt finally agreed to a new IV and it was placed by vascular access around 0345. Decision was made to assign one of the circulating nurses to pt's care d/t frequent de-escalation needs.

## 2017-12-13 NOTE — PLAN OF CARE
Problem: Patient Care Overview  Goal: Plan of Care/Patient Progress Review  Outcome: Improving  D/I/A: Neuro status improving. Slight RUE weakness, slight right facial droop w/ smile, slight right tongue deviation. Ongoing expressive aphasia, improving. Patient frustrated, agitated and uncooperative at times; patient remains impulsive w/ sitter at bedside. Patient denies pain. HR w/ frequent PVC's. BP elevated, unable to keep SBP<120 - MD informed, cardizem drip initiated. Requires encouragement for PO intake. Lungs mostly clear, strong productive cough, refusing IS. Voiding, using urinal. (-)BM, abd distended, laxatives administered. Up to chair w/ SBA.  P: Continue to monitor closely.

## 2017-12-13 NOTE — PROGRESS NOTES
Diabetes Consult Daily  Progress Note          Assessment/Plan:                          Seven Cat is a 72 year old male with history of  Diabetes, hypertension, hyperlipidemia, Hashimoto's thyroiditis,autoimmune hemolytic anemia ( blood transfusion for hemolysis), presented from [facility] after receiving tPA for left MCA stroke (12/9/2017).       Diabetic: a1C 7.3% (12/8/2017), value may not be accurate if receiving frequent blood  transfusion   Plan  - increase NPH  From 25 units to 30 unitsAM  - increase NPH from 25 units  To 30 units PM  - Novolog  meal insulin at 1 unit per 5 grams of CHO with meals and snacks/supplements   - d/c Novolog High correction scale every 4 hours  -Novolog sliding scale 1 unit per 20 > 140 before meals and > 200 HS  - d/c test glucose every 4 hours   -monitor glucose before meals HS and 0200       Will continue to follow    Plan discussed with patient, and family            Interval History:   The last 24 hours progress and nursing notes reviewed.  transitioned off IV insulin with NPH bid 25 units plus prandial and sliding scale novolog  Glucose in the 200-low 300 range  Would recommend rounding up when giving prandial insuring  Appetite seems great, denies nausea and or vomiting  [facility]    Steroid:  Prednisone 60 mg twice daily    Recent Labs  Lab 12/13/17  0410 12/13/17  0043 12/12/17  2015 12/12/17  1603 12/12/17  1145 12/12/17  0957  12/12/17  0434  12/11/17  0035  12/10/17  0511  12/09/17  0641  12/08/17  1545   GLC  --   --   --   --   --   --   --  146*  --  91  --  360*  --  161*  --  265*   * 281* 327* 332* 167* 126*  < >  --   < >  --   < >  --   < >  --   < >  --    < > = values in this interval not displayed.            Review of Systems:   See interval hx          Medications:       Active Diet Order      Combination Diet 7777-3752 Calories: Moderate Consistent CHO (4-6 CHO units/meal); Nectar Thickened Liquids  "(pre-thickened or use instant food thickener); Dysphagia Diet Level 2: Mechan Altered; Nectar Thickened Liquids (pre-thickened or use insta...     Physical Exam:  Gen: Alert,  NAD, sitting up in chair  HEENT:  mucous membranes are moist  Resp: Unlabored   Neuro:oriented person ( place and time not assessed), expressive aphasia  /43  Pulse 69  Temp 99.3  F (37.4  C) (Axillary)  Resp 16  Ht 1.778 m (5' 10\")  Wt 124.7 kg (274 lb 14.6 oz)  SpO2 94%  BMI 39.45 kg/m2           Data:     Lab Results   Component Value Date    A1C 7.3 12/08/2017              CBC RESULTS:   Recent Labs   Lab Test  12/12/17   0847   WBC  7.8   RBC  2.53*   HGB  8.3*   HCT  25.6*   MCV  101*   MCH  32.8   MCHC  32.4   RDW  23.7*   PLT  189     Recent Labs   Lab Test  12/12/17   0434  12/11/17   0604  12/11/17   0035   NA  148*   --   144   POTASSIUM  4.2  4.4  4.0   CHLORIDE  118*   --   114*   CO2  20   --   19*   ANIONGAP  10   --   11   GLC  146*   --   91   BUN  31*   --   35*   CR  1.17   --   1.54*   IZABELLA  8.1*   --   8.2*     Liver Function Studies -   Recent Labs   Lab Test  12/08/17   1545   PROTTOTAL  6.9   ALBUMIN  3.2*   BILITOTAL  2.8*   ALKPHOS  103   AST  23   ALT  25     Lab Results   Component Value Date    INR 1.31 12/11/2017    INR 1.23 12/09/2017    INR 1.25 12/08/2017           Giovanna Mckay [phone]  Diabetes Management job code [id number]            "

## 2017-12-13 NOTE — PROGRESS NOTES
Pt uncooperative with staff while refusing care and medication. Very frustrated with length of stay in hospital. Pt removed IV and required extensive persuading to place another IV. Refusing Nicardipine gtt to be reattached. Able to administered 10 mg of IV hydralazine. MD notified of behavior. 5 mg IM haldol ordered in order to obtain IV access. Wife appears to be upset/frustrated with current placement in ICU and hospital stay. Charge nurse explained that d/t high blood pressure last evening, pt required closer monitoring. Wife would like clarification about how long SBP goal remains less than 120.     Sitter remains at bedside. Currently patient is communicating with wife at bedside. Will attempt to control SBP and keep patient safe.

## 2017-12-13 NOTE — PLAN OF CARE
Problem: Patient Care Overview  Goal: Plan of Care/Patient Progress Review  Outcome: Declining  D: 72 M s/p stenting d/t MCA stroke.   I/A: Pt agitated/ combative most of evening. See previous nurse note. Unable to conduct assessments. Continues to have expressive aphasia. IV haldol administered at 0430 in order to administer antihypertensive meds and pt/ staff safety. Pt refusing all medication, nursing interventions, and frequently removing BP cuff. Required 5 nurses to administer meds. SBP goal <120; unable to achieve d/t pt behaviors and combativeness. Neuro crit team aware. D/t safety concern; unable to restart nicardipine gtt. Remains on RA. Adequate UOP. Up with 2 assist.   Daughter (Nurse) updated via phone this morning. Would like to be notified if pt's required sedating medications.   P: BP parameters need to be addressed. Team to discuss length of stay with pt and expectations while in hospital. Assess pt when able. Continue with sitter. Update team with concerns.

## 2017-12-13 NOTE — DISCHARGE SUMMARY
[facility], [place]    Neurology Stroke Discharge Summary    Date of Admission: 12/8/2017  Date of Discharge: 12/15/2017    Disposition: Transferred to [facility]  Primary Care Physician: Jeff Pinto      Admission Diagnosis:   Diabetes  Hypertension  Hyperlipidemia  Smoking  Hashimoto's  Autoimmune hemolytic anemia    Discharge Diagnosis:   Ischemic Stroke due to large-artery atherosclerosis (embolus/thrombosis)   Diabetes  Hypertension  Hyperlipidemia  Smoking  Hashimoto's  Autoimmune hemolytic anemia    Problem Leading to Hospitalization (from \[facility] [place] [facility]\""):   Patient is a pleasant 72-year-old male with past medical history as above who presented to the outside hospital confused, dysarthric with right-sided weakness. He was diagnosed with a left MCA syndrome at the [facility]. He was given IV TPA and then transferred to the [facility] for consideration of intervention.    Please see H&P dated 12/8/2017 for further details about presentation.    Brief Hospital Course:   Patient presented with  dysarthria, aphasia and right-sided weakness.    Found to have a left MCA syndrome at the [facility].    IV tPA was  administered at the outside hospital.    Patient was then transferred here for further care and management. Imaging was reviewed which showed a critical left ICA stenosis and likely etiology of his stroke. Upon arrival to the [facility] patient was taken for an MRI which showed an embolic punctate L MCA infarct.  His exam acutely worsened his first night at [facility] and he was noted to have an expressive aphasia. New CTH/CTA showed left frontal opercular and posterior MCA infarcts. This was discussed with the neuro-interventional team and subsequently, he was then taken for left ICA stenting. This procedure was completed on 12/9/2017. This showed a post angioplasty and stenting with less than 30% residual stenosis. The patient was  started on aspirin, plavix and atorvastatin 40mg. Following the stenting he continued to be stable and improve from a neurologic standpoint.    The patient did have other issues that were addressed during this hospitalization as described below.    #LT MCA distribution stroke with LT ICA stenosis S/P Stenting on 12/9  - BP Goal <120  - Continue ASA and Plavix  - Continue atorva 40mg    #HTN  Patient had fluctuating blood pressures throughout his hospital stay. The blood pressure likely fluctuated significantly due to pressure of the stenting on the baroreceptors. He was started on lisinopril which eventually was increased to 20mg BID. He was also started on amlodipine at 5mg per day. The blood pressure goal for Mr. Cat will continue at <120. However, with the current changes that have been made, we decided not to increase his blood pressure meds further.   - Lisinopril 20mg BID (increased to BID on 12/13)  - Continue amlodipine 5mg qday  - Continue to aim for blood pressures <120    #DM1  Patient presented with reported insulin of NPH 120u BID. Due to being unable to communicate properly with Mr. Cat, we consulted our endocrinology colleagues for further help in determining his insulin needs. He fluctuated significantly, but on the day of discharge it was thought that he would benefit from NPH 30u BID with a high dose SSI and 1u per 5 carb coverage.   - Continue NPH  30 unitsAM ( with prednisone 60 mg)  - Continue NPH 30 units units PM ( given at 1900)  - Meal insulin at 1 unit per 5 grams of CHO with meals and snacks/supplements   - High correction scale before meals and HS, 1 unit per 20 > 140 before meals and > 200 HS  - Monitor glucose before meals HS and 0200         #Autoimmune hemolytic anemia  Heme/onc was closely following and he has close follow up with Dr. Lino, his outpatient hematologist.  Currently, we will plan a prednisone taper and the following:  Recommend decreasing prednisone to 40 mg BID  starting 12/16, for 7 days duration, then 60 mg QD for 7 days, then 40 mg QD for 7 days, then 20 mg QD for 7 days, then discontinue.  During taper, Mr. Cat should receive CBCs and reticulocyte count checks at least weekly, with any significant decrease in hemoglobin further investigated in coordination with Hematology.  Patient should remain on a PPI during this time given concurrent need for ASA.  PJP prophylaxis could also be considered, and if steroid taper is extended, should be implemented.  Recommend follow-up with patients Hematologist, Dr. Lino, 4 weeks after hospital dismissal.        Rehab evaluation: OT, PT, SLP and PM&R.     Smoking Cessation: already quit smoking    BP Long-term Goal: <120 systolic    Antithrombotic/Anticoagulant Agent: aspirin 325 mg and clopidogrel (Plavix) 75 mg    Statins: Started on Atorva 40mg       Hgb A1C Goal: < 7.0      Other problems addressed during this hospitalization:  See above    PERTINENT INVESTIGATIONS    Labs  Lipid Panel:   Recent Labs   Lab Test  12/08/17   1545   CHOL  105   HDL  31*   LDL  49   TRIG  125     A1C:   Lab Results   Component Value Date    A1C 7.3 12/08/2017     INR:   Recent Labs  Lab 12/11/17  0035 12/09/17  0641 12/08/17  1545   INR 1.31* 1.23* 1.25*      Coag Panel / Hypercoag Workup: Not indicated  Pending test results: None    Echo:   Interpretation Summary  Technically difficult study. Poor acoustic windows.     Global and regional left ventricular function is normal with an EF of 55-60%.  Biplane EF calculated at 56%.  The right ventricle is normal size. Global right ventricular function is  normal.  The atrial septum is intact as assessed by color Doppler and agitated saline  bubble study.  No significant valvular abnormalities noted.  Dilation of the inferior vena cava is present with normal respiratory  variation in diameter. Estimated mean right atrial pressure is 8 mmHg.  No pericardial effusion present.    Imaging:   MR Brain:  1.  Numerous small foci of acute infarct in the left MCA distribution.  2. Occlusion of the proximal left internal carotid artery with  apparent reconstitution in the petrous segment, with resultant  decreased flow in the left MCA. Cannot exclude a string sign of the  left internal carotid artery.  3. The right internal carotid artery and the remainder of the [place]  [place] are patent. No right internal carotid artery stenosis.     CTA:  Impression:    1. New focal infarct of the inferior left frontal lobe which likely  would likely involve Broca's area and correlate with patient's  symptomatology.  2. Patchy subacute infarcts in left MCA distribution in the left  frontal, temporal, parietal lobes, corresponding with the findings on  prior MRI 12/8/2017.   3. Head CTA demonstrates patent anterior and posterior circulation.  Marked sclerotic plaque of the left intracranial internal carotid  artery again seen, with greatest stenosis at approximately 50-60%.  4. Neck CTA demonstrates marked atherosclerotic plaque of the left  internal carotid artery just distal to the bulb with approximately 90%  stenosis, and approximately 50% stenosis at the bulb. Mild plaque of  the right internal carotid artery, greatest at the bulb, with less  than 50% stenosis.    Endovascular procedure: LICA stenting     Cardiac Monitoring: Patient had > 24 hrs of cardiac monitor while in hospital.    Findings: No atrial fibrillation was found.    Sleep Apnea Screen:   Unable to screen ischemic stroke patient due to expressive aphasia    Sleep Apnea Screen Findings: Unable to screen    PHQ-9 Depression Screen Score: Unable to assess due to Expressive aphasia    Education discussed with: patient and spouse on blood pressure management, cholesterol management, medical management, diet modification and exercise recommendation.    During daily rounds, the plan of care was discussed and developed with patient and spouse.  Plan of care includes:  Transfer to TCU.    PHYSICAL EXAMINATION  Vital Signs:  B/P: 132/59, T: 98.8, P: 74, R: 22    General:  patient lying in bed without any acute distress     HEENT:  normocephalic/atraumatic  Cardio:  RRR  Pulmonary:  no respiratory distress  Abdomen:  soft, non-tender, non-distended  Extremities:  no edema  Skin:  intact, warm/dry     Neurologic  Mental Status:  fully alert, attentive and oriented, follows commands, expressive aphasia    Cranial Nerves:      -- visual fields full to confrontation, pupils 1mm; reacting equally to light bilaterally      -- extraocular movements intact      -- face symmetrical, able to raise eyebrows, show teeth, puff out cheeks      -- tongue midline      -- sensory V1-V3 intact bilaterally      -- palate elevates symmetrically, uvula midline      -- strong shoulder shrug      -- hearing grossly intact bilat to conversation    Motor:     Bulk and tone normal; no atrophy or twitching    Speed and dexterity could not be tested    Strength:    Delt Bi Tri WE WF    R 5 5 5 5 5 5   L 5 5 5 5 5 5     IP Quad Ham DF PF EHL   R 5 5- 5- 5 5 5   L 5 5 5 5 5 5     Sensory: symmetrically intact to light touch x4 extremities.     Reflexes: 2+ bilaterally and symmetric in biceps, triceps, brachioradialis, and patellae; no ankle clonus bilaterally; negative Babinski bilaterally; negative Jalloh's bilaterally      National Institutes of Health Stroke Scale (on day of discharge)  NIHSS Total Score: 2     Modified Heron Scale (on day of discharge): 3-Moderate disability; requiring some help, but able to walk without assistance    Medications    Current Discharge Medication List      START taking these medications    Details   albuterol (PROAIR HFA/PROVENTIL HFA/VENTOLIN HFA) 108 (90 BASE) MCG/ACT Inhaler Inhale 2 puffs into the lungs every 4 hours as needed for shortness of breath / dyspnea    Associated Diagnoses: Shortness of breath      amLODIPine (NORVASC) 5 MG tablet Take 1 tablet (5 mg) by  mouth daily  Qty: 30 tablet    Associated Diagnoses: Benign essential hypertension      clopidogrel (PLAVIX) 75 MG tablet 1 tablet (75 mg) by Oral or Feeding Tube route daily  Qty: 30 tablet    Associated Diagnoses: Cerebrovascular accident (CVA) due to stenosis of left carotid artery (H); Internal carotid artery stenosis, left      lisinopril (PRINIVIL/ZESTRIL) 20 MG tablet 1 tablet (20 mg) by Oral or Feeding Tube route 2 times daily  Qty: 30 tablet    Associated Diagnoses: Benign essential hypertension      pantoprazole (PROTONIX) 40 MG EC tablet Take 1 tablet (40 mg) by mouth every morning  Qty: 30 tablet    Associated Diagnoses: Gastroesophageal reflux disease without esophagitis      polyethylene glycol (MIRALAX/GLYCOLAX) Packet Take 17 g by mouth daily  Qty: 7 packet    Associated Diagnoses: Constipation, unspecified constipation type      !! QUEtiapine (SEROQUEL) 25 MG tablet Take 1 tablet (25 mg) by mouth daily  Qty: 60 tablet    Associated Diagnoses: Agitation      !! QUEtiapine (SEROQUEL) 50 MG tablet Take 1 tablet (50 mg) by mouth every evening  Qty: 120 tablet    Associated Diagnoses: Agitation      senna-docusate (SENOKOT-S;PERICOLACE) 8.6-50 MG per tablet Take 2 tablets by mouth 2 times daily  Qty: 100 tablet    Associated Diagnoses: Constipation, unspecified constipation type      predniSONE (DELTASONE) 20 MG tablet Recommend decreasing prednisone to 40 mg BID starting 12/16, for 7 days duration, then 60 mg QD for 7 days, then 40 mg QD for 7 days, then 20 mg QD for 7 days, then discontinue.    Associated Diagnoses: Autoimmune hemolytic anemia (H)       !! - Potential duplicate medications found. Please discuss with provider.      CONTINUE these medications which have CHANGED    Details   aspirin 325 MG tablet 1 tablet (325 mg) by Oral or Feeding Tube route daily  Qty: 120 tablet    Associated Diagnoses: Cerebrovascular accident (CVA) due to stenosis of left carotid artery (H); Internal carotid artery  stenosis, left      atorvastatin (LIPITOR) 40 MG tablet 1 tablet (40 mg) by Oral or Feeding Tube route daily  Qty: 30 tablet    Associated Diagnoses: Internal carotid artery stenosis, left; Cerebrovascular accident (CVA) due to stenosis of left carotid artery (H)      folic acid (FOLVITE) 1 MG tablet Take 1 tablet (1 mg) by mouth daily  Qty: 30 tablet    Associated Diagnoses: Nutritional deficiency      levothyroxine (SYNTHROID/LEVOTHROID) 175 MCG tablet 1 tablet (175 mcg) by Oral or Feeding Tube route every morning (before breakfast)  Qty: 30 tablet    Associated Diagnoses: Hypothyroidism, unspecified type      cholecalciferol 1000 UNITS TABS Take 1,000 Units by mouth daily  Qty: 30 tablet    Associated Diagnoses: Nutritional deficiency      !! insulin aspart (NOVOLOG PEN) 100 UNIT/ML injection Subcutaneous, WITH SNACKS OR SUPPLEMENTS, high blood sugar, Starting Mon 12/11/17 at 1602  DOSE:  1 units per 5 grams of carbohydrate. Only chart total amount of units given.  Do not give if pre-prandial glucose is less than 60 mg/dL. If given at mealtime, must be administered 5 min before meal or immediately after.    Associated Diagnoses: Type 1 diabetes mellitus with complication (H)      !! insulin aspart (NOVOLOG PEN) 100 UNIT/ML injection Subcutaneous, 3 TIMES DAILY WITH MEALS, First dose on Fri 12/15/17 at 1045  DOSE:  1 units per 5 grams of carbohydrate.  Only chart total amount of units given.  Do not give if pre-prandial glucose is less than 60 mg/dL. If given at mealtime, must be administered 5 min before meal or immediately after.    Associated Diagnoses: Type 1 diabetes mellitus with complication (H)      !! insulin aspart (NOVOLOG PEN) 100 UNIT/ML injection Correction Scale - HIGH INSULIN RESISTANCE DOSING     Do Not give Correction Insulin if Pre-Meal BG less than 140.   For Pre-Meal  - 164 give 1 unit.   For Pre-Meal  - 189 give 2 units.   For Pre-Meal  - 214 give 3 units.   For Pre-Meal BG  215 - 239 give 4 units.   For Pre-Meal  - 264 give 5 units.   For Pre-Meal  - 289 give 6 units.   For Pre-Meal  - 314 give 7 units.   For Pre-Meal  - 339 give 8 units.    Comments: For Pre-Meal BG greater than or equal to 365 give 10 units To be given with prandial insulin, and based on pre-meal blood glucose.  Notify provider if glucose greater than or equal to 350 mg/dL after administration of correction dose.  Associated Diagnoses: Type 1 diabetes mellitus with complication (H)      !! insulin aspart (NOVOLOG PEN) 100 UNIT/ML injection HIGH INSULIN RESISTANCE DOSING    Do Not give Bedtime Correction Insulin if BG less than 200.   For  - 224 give 1 units.   For  - 249 give 2 units.   For  - 274 give 3 units.   For  - 299 give 4 units.   For  - 324 give 5 units.   For  - 349 give 6 units.   For BG greater than or equal to 350 give 7 units.   Notify provider if glucose greater than or equal to 350 mg/dL after administration of correction dose.    Associated Diagnoses: Type 1 diabetes mellitus with complication (H)      !! insulin isophane human (HUMULIN N PEN) 100 UNIT/ML injection Inject 30 Units Subcutaneous every 24 hours    Comments: 30 Units, Subcutaneous, EVERY 24 HOURS, First dose on Fri 12/15/17 at 1900  Associated Diagnoses: Type 1 diabetes mellitus with complication (H)      !! insulin isophane human (HUMULIN N PEN) 100 UNIT/ML injection Inject 30 Units Subcutaneous every 24 hours    Associated Diagnoses: Type 1 diabetes mellitus with complication (H)       !! - Potential duplicate medications found. Please discuss with provider.      STOP taking these medications       TAMSULOSIN HCL PO Comments:   Reason for Stopping:               Additional recommendations and follow up:      General info for SNF   Length of Stay Estimate: Short Term Care: Estimated # of Days <30  Condition at Discharge: Stable  Level of care:skilled   Rehabilitation  Potential: Good  Admission H&P remains valid and up-to-date: Yes  Recent Chemotherapy: N/A  Use Nursing Home Standing Orders: Yes     Mantoux instructions   Give two-step Mantoux (PPD) Per Facility Policy Yes     Reason for your hospital stay   You were hospitalized for a left sided stroke causing it to be difficult to talk.     Glucose monitor nursing POCT   Before meals and at bedtime     Follow Up and recommended labs and tests   Follow up with half-way physician.  The following labs/tests are recommended: CBC weekly.  Follow up with neurology in 1-2 months  Follow up with hematology in 4 weeks.  Follow up with endocrinology in 1-2 weeks.     Additional Discharge Instructions   Take aspirin, plavix and atorvastatin daily for your stent.    Regarding your insulin, take NPH 30u BID. Take high dose SSI and carb coverage as described.    Regarding your prednisone:  Recommend decreasing prednisone to 40 mg BID starting 12/16, for 7 days duration, then 60 mg QD for 7 days, then 40 mg QD for 7 days, then 20 mg QD for 7 days, then discontinue.    Follow up as above     Activity - Up with assistive device     Full Code     Physical Therapy Adult Consult   Evaluate and treat as clinically indicated.    Reason:  stroke     Occupational Therapy Adult Consult   Evaluate and treat as clinically indicated.    Reason:  stroke     Speech Language Path Adult Consult   Evaluate and treat as clinically indicated.    Reason:  stroke     Advance Diet as Tolerated   Follow this diet upon discharge: Orders Placed This Encounter     Snacks/Supplements Adult: Magic Cup; With Meals     Combination Diet 2321-1409 Calories: Moderate Consistent CHO (4-6 CHO units/meal); Nectar Thickened Liquids (pre-thickened or use instant food thickener); Dysphagia Diet Level 2: Mechan Altered; Nectar Thickened Liquids (pre-thickened or use insta...       - Continue NPH  30 unitsAM ( with prednisone 60 mg)  - Continue NPH 30 units units PM ( given at  1900)  - Meal insulin at 1 unit per 5 grams of CHO with meals and snacks/supplements   - High correction scale before meals and HS, 1 unit per 20 > 140 before meals and > 200 HS  - Monitor glucose before meals HS and 0200       Patient was seen and discussed with the Attending, Dr. Oh.    Nicolas Navarro  Pager: [phone]

## 2017-12-14 ENCOUNTER — APPOINTMENT (OUTPATIENT)
Dept: OCCUPATIONAL THERAPY | Facility: CLINIC | Age: 72
DRG: 034 | End: 2017-12-14
Attending: PSYCHIATRY & NEUROLOGY
Payer: COMMERCIAL

## 2017-12-14 ENCOUNTER — APPOINTMENT (OUTPATIENT)
Dept: SPEECH THERAPY | Facility: CLINIC | Age: 72
DRG: 034 | End: 2017-12-14
Attending: PSYCHIATRY & NEUROLOGY
Payer: COMMERCIAL

## 2017-12-14 LAB
ANION GAP SERPL CALCULATED.3IONS-SCNC: 8 MMOL/L (ref 3–14)
BUN SERPL-MCNC: 32 MG/DL (ref 7–30)
CALCIUM SERPL-MCNC: 8.3 MG/DL (ref 8.5–10.1)
CHLORIDE SERPL-SCNC: 116 MMOL/L (ref 94–109)
CO2 SERPL-SCNC: 22 MMOL/L (ref 20–32)
CREAT SERPL-MCNC: 1.14 MG/DL (ref 0.66–1.25)
ERYTHROCYTE [DISTWIDTH] IN BLOOD BY AUTOMATED COUNT: 20.8 % (ref 10–15)
GFR SERPL CREATININE-BSD FRML MDRD: 63 ML/MIN/1.7M2
GLUCOSE BLDC GLUCOMTR-MCNC: 180 MG/DL (ref 70–99)
GLUCOSE BLDC GLUCOMTR-MCNC: 212 MG/DL (ref 70–99)
GLUCOSE BLDC GLUCOMTR-MCNC: 257 MG/DL (ref 70–99)
GLUCOSE BLDC GLUCOMTR-MCNC: 280 MG/DL (ref 70–99)
GLUCOSE BLDC GLUCOMTR-MCNC: 377 MG/DL (ref 70–99)
GLUCOSE SERPL-MCNC: 274 MG/DL (ref 70–99)
HCT VFR BLD AUTO: 25 % (ref 40–53)
HGB BLD-MCNC: 8.1 G/DL (ref 13.3–17.7)
INTERPRETATION ECG - MUSE: NORMAL
MCH RBC QN AUTO: 31.8 PG (ref 26.5–33)
MCHC RBC AUTO-ENTMCNC: 32.4 G/DL (ref 31.5–36.5)
MCV RBC AUTO: 98 FL (ref 78–100)
PLATELET # BLD AUTO: 189 10E9/L (ref 150–450)
POTASSIUM SERPL-SCNC: 4.8 MMOL/L (ref 3.4–5.3)
RBC # BLD AUTO: 2.55 10E12/L (ref 4.4–5.9)
SODIUM SERPL-SCNC: 146 MMOL/L (ref 133–144)
WBC # BLD AUTO: 5.5 10E9/L (ref 4–11)

## 2017-12-14 PROCEDURE — 36415 COLL VENOUS BLD VENIPUNCTURE: CPT | Performed by: INTERNAL MEDICINE

## 2017-12-14 PROCEDURE — 80048 BASIC METABOLIC PNL TOTAL CA: CPT | Performed by: INTERNAL MEDICINE

## 2017-12-14 PROCEDURE — 92507 TX SP LANG VOICE COMM INDIV: CPT | Mod: GN | Performed by: SPEECH-LANGUAGE PATHOLOGIST

## 2017-12-14 PROCEDURE — 85027 COMPLETE CBC AUTOMATED: CPT | Performed by: INTERNAL MEDICINE

## 2017-12-14 PROCEDURE — 25000132 ZZH RX MED GY IP 250 OP 250 PS 637: Performed by: STUDENT IN AN ORGANIZED HEALTH CARE EDUCATION/TRAINING PROGRAM

## 2017-12-14 PROCEDURE — 00000146 ZZHCL STATISTIC GLUCOSE BY METER IP

## 2017-12-14 PROCEDURE — 97535 SELF CARE MNGMENT TRAINING: CPT | Mod: GO | Performed by: OCCUPATIONAL THERAPIST

## 2017-12-14 PROCEDURE — 40000133 ZZH STATISTIC OT WARD VISIT: Performed by: OCCUPATIONAL THERAPIST

## 2017-12-14 PROCEDURE — 12000001 ZZH R&B MED SURG/OB UMMC

## 2017-12-14 PROCEDURE — 25000125 ZZHC RX 250: Performed by: STUDENT IN AN ORGANIZED HEALTH CARE EDUCATION/TRAINING PROGRAM

## 2017-12-14 PROCEDURE — 25000128 H RX IP 250 OP 636: Performed by: STUDENT IN AN ORGANIZED HEALTH CARE EDUCATION/TRAINING PROGRAM

## 2017-12-14 PROCEDURE — 25000132 ZZH RX MED GY IP 250 OP 250 PS 637: Performed by: NURSE PRACTITIONER

## 2017-12-14 PROCEDURE — 25000131 ZZH RX MED GY IP 250 OP 636 PS 637: Performed by: STUDENT IN AN ORGANIZED HEALTH CARE EDUCATION/TRAINING PROGRAM

## 2017-12-14 PROCEDURE — 25000132 ZZH RX MED GY IP 250 OP 250 PS 637: Performed by: PSYCHIATRY & NEUROLOGY

## 2017-12-14 PROCEDURE — 92526 ORAL FUNCTION THERAPY: CPT | Mod: GN | Performed by: SPEECH-LANGUAGE PATHOLOGIST

## 2017-12-14 PROCEDURE — 40000225 ZZH STATISTIC SLP WARD VISIT: Performed by: SPEECH-LANGUAGE PATHOLOGIST

## 2017-12-14 RX ORDER — AMLODIPINE BESYLATE 2.5 MG/1
5 TABLET ORAL DAILY
Status: DISCONTINUED | OUTPATIENT
Start: 2017-12-14 | End: 2017-12-15 | Stop reason: HOSPADM

## 2017-12-14 RX ADMIN — ATORVASTATIN CALCIUM 40 MG: 40 TABLET, FILM COATED ORAL at 08:25

## 2017-12-14 RX ADMIN — QUETIAPINE FUMARATE 25 MG: 25 TABLET ORAL at 08:32

## 2017-12-14 RX ADMIN — HYDRALAZINE HYDROCHLORIDE 10 MG: 20 INJECTION INTRAMUSCULAR; INTRAVENOUS at 06:44

## 2017-12-14 RX ADMIN — QUETIAPINE 50 MG: 50 TABLET ORAL at 16:46

## 2017-12-14 RX ADMIN — VITAMIN D, TAB 1000IU (100/BT) 1000 UNITS: 25 TAB at 08:25

## 2017-12-14 RX ADMIN — POLYETHYLENE GLYCOL 3350 17 G: 17 POWDER, FOR SOLUTION ORAL at 08:33

## 2017-12-14 RX ADMIN — PANTOPRAZOLE SODIUM 40 MG: 40 TABLET, DELAYED RELEASE ORAL at 08:25

## 2017-12-14 RX ADMIN — PREDNISONE 60 MG: 50 TABLET ORAL at 17:56

## 2017-12-14 RX ADMIN — LISINOPRIL 20 MG: 20 TABLET ORAL at 20:42

## 2017-12-14 RX ADMIN — AMLODIPINE BESYLATE 5 MG: 2.5 TABLET ORAL at 12:32

## 2017-12-14 RX ADMIN — LISINOPRIL 20 MG: 20 TABLET ORAL at 08:25

## 2017-12-14 RX ADMIN — Medication 10 MG: at 06:57

## 2017-12-14 RX ADMIN — ASPIRIN 325 MG ORAL TABLET 325 MG: 325 PILL ORAL at 08:23

## 2017-12-14 RX ADMIN — PREDNISONE 60 MG: 50 TABLET ORAL at 08:33

## 2017-12-14 RX ADMIN — SENNOSIDES AND DOCUSATE SODIUM 2 TABLET: 8.6; 5 TABLET ORAL at 08:23

## 2017-12-14 RX ADMIN — FOLIC ACID 1 MG: 1 TABLET ORAL at 08:25

## 2017-12-14 RX ADMIN — LEVOTHYROXINE SODIUM 175 MCG: 50 TABLET ORAL at 08:25

## 2017-12-14 RX ADMIN — CLOPIDOGREL 75 MG: 75 TABLET, FILM COATED ORAL at 08:25

## 2017-12-14 ASSESSMENT — VISUAL ACUITY
OU: GLASSES;NORMAL ACUITY

## 2017-12-14 NOTE — PLAN OF CARE
Problem: Patient Care Overview  Goal: Plan of Care/Patient Progress Review  Discharge Planner OT   Patient plan for discharge: TCU  Current status: pt continues to have expressive aphasia- difficulty speaking and writing, Pt able to walk to bathroom with walker and SBA for simple g/h tasks.  Barriers to return to prior living situation: deconditioning, expressive aphasia  Recommendations for discharge: TCU at this time, with continued progress pt may be able to go home with outpatient OT/PT pending LOS.   Rationale for recommendations: to increase ADL I and safety.       Entered by: Jose Manciin 12/14/2017 4:17 PM

## 2017-12-14 NOTE — PLAN OF CARE
Neuro: AOX2, PERRLA. Follows commands on all extremities. Right facial droop present and tongue weakness present. Severe expressive aphasia present.      CV: SR HR 60s - 70s. BP Labile [id number] over 60's. Hydralazine x1 for SBP > 120.    Resp: Sounds clear with diminished bases. On room air sating > 95%.      GI: Positive bowel sounds. DD2 diet. No BM.     : Adequate urine production using a urinal. Urine color grace.       Skin: Intact other bruising.    Access: Right arm PIV saline locked..        Plan: Neuro checks, hemodynamic monitoring and notifying the MD with any concerns.

## 2017-12-14 NOTE — PROGRESS NOTES
Diabetes Consult Daily  Progress Note          Assessment/Plan:   Seven Cat is a 72 year old male with history of  Diabetes, hypertension, hyperlipidemia, Hashimoto's thyroiditis,autoimmune hemolytic anemia ( blood transfusion for hemolysis), presented from [facility] after receiving tPA for left MCA stroke (12/9/2017).       Diabetic: a1C 7.3% (12/8/2017), value may not be accurate if receiving frequent blood  transfusion   Plan  - increase NPH  From 30 units to 36 unitsAM  - increase NPH from 30 units  To 36 units PM  -  Increased Novolog  meal insulin at 1 unit per 3 grams of CHO with meals and snacks/supplements   - d/c Novolog sliding scale 1 unit per 20 > 140 before meals and > 200 HS  -Novolog very high correction scale before meals and HS  -monitor glucose before meals HS and 0200       Will continue to follow                      Plan discussed with bedside RN  [facility] vs [facility]           Interval History:   The last 24 hours progress and nursing notes reviewed.    Blood sugars continue to be above target in the high 200-300 range despite increase in basal and prandial insulin.  Appetite seems good, no new issues          Recent Labs  Lab 12/14/17  0756 12/14/17  0413 12/13/17  2116 12/13/17  2019 12/13/17  1712 12/13/17  1133 12/13/17  0802  12/12/17  0434  12/11/17  0035  12/10/17  0511  12/09/17  0641  12/08/17  1545   GLC  --  274*  --   --   --   --   --   --  146*  --  91  --  360*  --  161*  --  265*   *  --  265* 280* 209* 223* 309*  < >  --   < >  --   < >  --   < >  --   < >  --    < > = values in this interval not displayed.            Review of Systems:   See interval hx          Medications:       Active Diet Order      Combination Diet [id number] Calories: Moderate Consistent CHO (4-6 CHO units/meal); Nectar Thickened Liquids (pre-thickened or use instant food thickener); Dysphagia Diet Level 2: Mechan Altered; Nectar Thickened Liquids  "(pre-thickened or use insta...     Physical Exam:  Gen: NAD   Resp: Unlabored  Neuro: expressive aphasia  BP (!) 117/99  Pulse 74  Temp 98  F (36.7  C) (Axillary)  Resp 21  Ht 1.778 m (5' 10\")  Wt 124.7 kg (274 lb 14.6 oz)  SpO2 95%  BMI 39.45 kg/m2           Data:     Lab Results   Component Value Date    A1C 7.3 12/08/2017              CBC RESULTS:   Recent Labs   Lab Test  12/14/17 0413   WBC  5.5   RBC  2.55*   HGB  8.1*   HCT  25.0*   MCV  98   MCH  31.8   MCHC  32.4   RDW  20.8*   PLT  189     Recent Labs   Lab Test  12/14/17 0413 12/12/17   0434   NA  146*  148*   POTASSIUM  4.8  4.2   CHLORIDE  116*  118*   CO2  22  20   ANIONGAP  8  10   GLC  274*  146*   BUN  32*  31*   CR  1.14  1.17   IZABELLA  8.3*  8.1*     Liver Function Studies -   Recent Labs   Lab Test  12/08/17   1545   PROTTOTAL  6.9   ALBUMIN  3.2*   BILITOTAL  2.8*   ALKPHOS  103   AST  23   ALT  25     Lab Results   Component Value Date    INR 1.31 12/11/2017    INR 1.23 12/09/2017    INR 1.25 12/08/2017       Giovanna Mckay [phone]  Diabetes Management job code [id number]            "

## 2017-12-14 NOTE — PROGRESS NOTES
Social Work Services Progress Note    Hospital Day: 7  Date of Initial Social Work Evaluation:  12/11/17  Collaborated with:  Patient/wife, chart review, attended rounds, RN FRANCISCO Noel     Data:  Met with pt and wife at the bedside, along with SHARON Noel to follow up on d/c plans and answer questions. RN CC updated about [facility] bed availability, noting that they had nothing available for pt today. Discussed that pt may be ready for rehab d/c as early as tmrw, so discussed referral process for this, as hospital transfer may not happen. Explained that pt will need to be off 1:1 for 24 hours before they will accept pt at a rehab facility and to keep this in mind regarding timeline. [facility]. Explained that his admittance there (or any rehab facility) is also subject to bed availability, similar to hospital transfer situation. Asked that they select 2-3 back up options in case there are no beds available at the [facility]. Noted that if he is ready for d/c and there is no bed available at [facility], we will have to move forward with discharge to a different facility. Answered numerous questions from wife about rehab stay, potential floor transfer, potential hospital transfer, insurance coverage for rehab, etc. Provided reassurance that RN FRANCISCO and SW are working on these processes and pt's wife does not need to do anything, other than provide options for referrals. Reflected which areas are out of her and our control (i.e., bed availability at facilities, when pt will be deemed safe to remove 1:1, etc.). Summarized discussion by noting that 1) RN FRANCISCO will continue to work on VA hospital transfer as long as pt is medically appropriate for hospital level of care, 2) SW will work with them on referral process for rehab, and 3) we'll remind care team that pt needs to be off 1:1 for 24 hours prior to d/c to rehab facility.     After meeting with pt/wife, SW and SHARON SINGH spoke with bedside nurse to assess whether pt is  still in need of 1:1. Spoke with Arianne (Ph. [phone]) at the [facility] referral line to follow up with rehab referral. Arianne stated that as long as pt is on 1:1, they will not consider him for admission. She encouraged SW to call back as soon as it looks like pt is ready for discharge, but that they would not look at him prior to this. She then said that they have 24 hours to review, so at this point, the soonest pt would be able to d/c to their rehab facility would be Monday or Tuesday. She said that pt could be eligible for VA contracted rehab facility or use his Medicare to go to any rehab facility. If he goes to a non-VA contracted facility, pt would be responsible for Medicare co-pays after 20 days. In order to check eligibilty for VA-contracted facility and to obtain the list of facilities, Arianne instructed SW to call [facility] SW [facility] (Ph. [phone]). Called [facility] 2x and left voicemails requesting call back as soon as possible, noting that pt will be ready for d/c soon.     Met with pt's wife again to provide Medicare.gov list of rehab facilities closest to their home in [place]. Encouraged her to look this over and also noted that I'm waiting to hear back about eligibility and list for VA-contracted facilities. She expressed understanding and was planning to go home for the afternoon. Encouraged her to look over the list.     Intervention:  Further education on discharge planning process. Collaboration with family, RN CC and [facility].     Assessment:  Pt still having difficulties speaking but appeared to be listening and seemed to nod understanding. Pt's wife seemed very overwhelmed and had a hard time understanding process, despite both SW and RN CC reiterating things several times. Tried to summarize and narrow focus for wife to help her limit energies to the areas in which she had some control.    Plan:    Anticipated Disposition:  [facility] hospital transfer vs rehab    Barriers to d/c plan:  Medical stability,  bed availability, 1:1 status    Follow Up:  SW will continue to follow, support and assist with ongoing social service and discharge planning needs.

## 2017-12-14 NOTE — PLAN OF CARE
Problem: Patient Care Overview  Goal: Plan of Care/Patient Progress Review  Discharge Planner SLP   Patient plan for discharge: Pt unable to state.   Current status: Pt seen for dysphagia and aphasia therapy. Pt continues to demonstrate significant expressive deficits. He does have some spontaneous verbalizations this date. He followed simple verbal commands. Pt attempting to communicate with gestures and with his communication board. Pt demonstrates decreased oral manipulation of bolus. Recommend pt continue on Dysphagia diet level 2 with nectar thickened liquids. Sit pt upright for po intake. Encourage small bites/sips and slow rate. Ask pt simple yes/no questions as he is most successful with these.   Barriers to return to prior living situation: Dysphagia, severity of language deficits.   Recommendations for discharge: TCU- pending progress pt may be able to return home with outpatient SLP services.  Rationale for recommendations: Pt will likely require SLP services to optimize speech/language functions following discharge from this hospitalization.       Entered by: Pattie Zapata 12/14/2017 4:54 PM

## 2017-12-14 NOTE — PROGRESS NOTES
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 Cat is a 72 year old man with a documented history of AIHA, previously on steroids, now admitted following significant L MCA infarct s/p tPA and stenting of the left ICA in the setting of an AIHIA flare.     Hemoglobin and other hemolysis labs now stabilized with steroids, reticulocyte response appears adequate (12.7% w/ Hgb 8.8).  B12 and iron replete.  Moving forward, Mr. Cat's steroids should be tapered as outlined below.       RECOMMENDATIONS:  --Recommend decreasing prednisone to 40 mg BID starting 12/16, for 7 days duration, then 60 mg QD for 7 days, then 40 mg QD for 7 days, then 20 mg QD for 7 days, then discontinue.  During taper, Mr. Cat should receive CBCs and reticulocyte count checks at least weekly, with any significant decrease in hemoglobin further investigated in coordination with Hematology.  Patient should remain on a PPI during this time given concurrent need for ASA.  PJP prophylaxis could also be considered, and if steroid taper is extended, should be implemented.  Recommend follow-up with patients Hematologist, Dr. Lino, 4 weeks after hospital dismissal.    --We will sign off at this time; please page the Hematology Consult Service with any further questions.      Michael Lama  Hematology/Oncology fellow  December 15, 2017    Physical Exam   Vitals:    12/08/17 1500 12/12/17 0130   Weight: 124 kg (273 lb 5.9 oz) 124.7 kg (274 lb 14.6 oz)     Vital Signs with Ranges  Temp:  [98  F (36.7  C)-99.1  F (37.3  C)] 98  F (36.7  C)  Heart Rate:  [61-86] 67  Resp:  [7-22] 21  BP:  ()/() 117/99  SpO2:  [95 %-99 %] 95 %  I/O last 3 completed shifts:  In: 2000 [P.O.:2000]  Out: 2125 [Urine:2125]    MEDS  Medications     IV fluid REPLACEMENT ONLY       - MEDICATION INSTRUCTIONS -       - MEDICATION INSTRUCTIONS -         insulin isophane human  36 Units Subcutaneous Q24H     insulin isophane human  36 Units Subcutaneous Q24H     insulin aspart  1-22 Units Subcutaneous TID AC     insulin aspart  1-16 Units Subcutaneous At Bedtime     amLODIPine  5 mg Oral Daily     QUEtiapine  25 mg Oral Daily     QUEtiapine  50 mg Oral QPM     lisinopril  20 mg Oral or Feeding Tube BID     cholecalciferol  1,000 Units Oral Daily     folic acid (FOLVITE) tablet 1 mg  1 mg Oral Daily     insulin aspart   Subcutaneous TID w/meals     clopidogrel  75 mg Oral or Feeding Tube Daily     pantoprazole  40 mg Oral QAM     senna-docusate  2 tablet Oral BID     polyethylene glycol  17 g Oral Daily     atorvastatin  40 mg Oral or Feeding Tube Daily     aspirin  325 mg Oral or Feeding Tube Daily     levothyroxine  175 mcg Oral or Feeding Tube QAM AC     predniSONE  60 mg Oral BID w/meals     sodium chloride (PF)  3 mL Intracatheter Q8H       LABS  Recent Labs   Lab Test  12/14/17   0413  12/12/17   0847  12/11/17   1806  12/11/17   0604  12/11/17   0035   12/08/17   1545   WBC  5.5  7.8  6.9  9.3  9.4   < >  8.8   NEUTROPHIL   --    --    --    --    --    --   87.0   HGB  8.1*  8.3*  7.1*  7.3*  6.7*   < >  7.8*   PLT  189  189  200  200  251   < >  237    < > = values in this interval not displayed.     Recent Labs   Lab Test  12/14/17   0413  12/12/17   0434  12/11/17   0604  12/11/17   0035  12/10/17   0511  12/09/17   0641   NA  146*  148*   --   144  141  141   POTASSIUM  4.8  4.2  4.4  4.0  4.7  4.5   CHLORIDE  116*  118*   --   114*  111*  110*   CO2  22  20   --   19*  20  22   ANIONGAP  8  10   --   11  10  9   BUN  32*  31*   --   35*  27  26   CR  1.14  1.17   --   1.54*  1.25  1.17   IZABELLA  8.3*  8.1*    --   8.2*  7.7*  8.9     Recent Labs   Lab Test  12/11/17   0035  12/10/17   0511  12/08/17   1545   MAG  2.5*  2.2   --    PHOS  3.2  3.6   --    LDH   --    --   340*     Recent Labs   Lab Test  12/08/17   1545   BILITOTAL  2.8*   ALKPHOS  103   ALT  25   AST  23   ALBUMIN  3.2*   LDH  340*       All laboratory and imaging data in the past 24 hours reviewed     CULTURES    Recent Labs   Lab Test  12/11/17   0138  12/11/17   0130  12/08/17   1601   CULT  No growth after 3 days  Canceled, Test credited  Canceled via EPIC interface     --    SDES  Blood Unspecified Site  Blood  Nares       IMAGING  No results found for this or any previous visit (from the past 24 hour(s)).

## 2017-12-14 NOTE — PROGRESS NOTES
Care Coordinator- Discharge Planning     Admission Date/Time:  12/8/2017  Attending MD:  Timbo Bullard*     Data  Chart reviewed, discussed with interdisciplinary team.   [facility] with L MCA syndrome  Pt was given tPA and transferred to [facility] for higher level of care.         Assessment  Full assessment completed in previous note     Pt is medically ready to be transfer back to [facility].  Pt is awaiting bed availability at the [facility].    Coordination of Care   RNCC called [facility]rianne [facility] referral Special[facility] # [phone] to check bed availability.  Arianne stated they still don't have open bed for today and request that I call back tomorrow, 12/15.  RNCC shared the above info to the team, pt and pt spouse.  The team stated pt is stable and by tomorrow he might be ready for rehab placement.  Pt cont. requiring sitter.  Pt need to be off sitter for 24 hrs to be able to go to rehab facilities.  SW shared the info to the team and pt spouse.  RNCC and SW visited pt and spouse and discussed in detail about the referral process and that we are still waiting for bed availability at [facility].  Pt spouse expressed being overwhelmed and frustrated for not knowing when pt is going to be transfer to the [facility].  RNCC and SW informed pt spouse that we will cont to follow up about transfer back to [facility] vs rehab.  SW is contacting [facility] to make referral to rehab if no be is available tomorrow morning at the [facility].      Received a call from [facility] , Isha stating pt spouse has been calling [facility] to check when pt can be transfer back.  RNCC informed Isha that we are still waiting for bed.  Isha suggested to cont. to work with Arianne for transfer or rehab placement.  Isha request to get update at time of d/c.  Isha's contact # [phone]      Plan  Anticipated Discharge Date:  TBD.  Anticipated Discharge Plan:   Transfer back to [facility]  center vs rehab.   [facility] bed [facility] at [facility].  RNCC will call Arianne # [phone] tomorrow morning to check bed availability.  SW is following for rehab placement if no be at the medical center.      Sadie Rodriguez RN, PHN, BSN  4A and 4E/ ICU  Care Coordinator  Phone: [phone]  Pager: [phone]

## 2017-12-14 NOTE — PLAN OF CARE
Problem: Patient Care Overview  Goal: Plan of Care/Patient Progress Review  Outcome: Improving  D/I/A: Meuro status improving. Patient currently calm and non-combative, participating in cares, following instruction; patient remains impulsive - sitter at bedside. Slight right sided facial droop w/ smile, slight RUE weakness, difficulty swallowing thin liquids. Expressive aphasia slightly improved, alternative communication devices provided to patient. HR w/ ongoing frequent PVC w/ runs of bigeminy and bradycardia - MD alerted to change. SBP goal <120 maintained w/ PRN labetalol and hydralazine. Lungs clear, diminished, on RA. ABD distended, (+)flatus, (-)BM today. Voiding, using urinal/commode. Tolerating prescribed diet. Transfer w/ SBA. Denies pain.  P: Transfer care to  / [facility] pending bed availability.

## 2017-12-14 NOTE — PROGRESS NOTES
[facility]  NeuroICU Daily ICU Note:         Chief Complaints   Seven Cat is a 72 year old man with past medical history significant for autoimmune hemolytic anemia, Hashimoto's thyroiditis, DM, Hypertension, and hyperlipidemia presenting with Lt MCA stroke, s/p Stent on 11/9.          Subjective (24 hours events)     Patient had an uneventful night. No further agitation episodes. VSS and required only one dose of labetalol          Objective   Temp:  [98  F (36.7  C)-99.1  F (37.3  C)] 99.1  F (37.3  C)  Heart Rate:  [61-86] 73  Resp:  [7-25] 25  BP: ()/() 100/54  SpO2:  [95 %-99 %] 98 %    No Data Recorded    Resp: 25    I/O last 3 completed shifts:  In: 2000 [P.O.:2000]  Out: 2125 [Urine:2125]    Physical Exam  General: NAD, lying comfortably in bed  Neurologic    Mental Status:       -- Awake; Alert; oriented x 3      -- Follows commands       -- Expressive aphasia, able to comprehend commands      -- no gaze preference. No apparent hemineglect.    Cranial Nerves:      -- visual fields full to confrontation, pupils 1mm; reacting equally to light bilaterally      -- extraocular movements intact      -- face symmetrical, able to raise eyebrows, show teeth, puff out cheeks      -- tongue midline      -- sensory V1-V3 intact bilaterally      -- palate elevates symmetrically, uvula midline      -- strong shoulder shrug      -- hearing grossly intact bilat to conversation    Motor:     Bulk and tone normal; no atrophy or twitching    Speed and dexterity could not be tested    Strength:   Delt Bi Tri WE WF    R 5 5 5 5 5 4-   L 5 5 5 5 5 5    IP Quad Ham DF PF EHL   R 5 4- 4- 5 5 5   L 5 5 5 5 5 5     Sensory: symmetrically intact to light touch x4 extremities.     Reflexes: 2+ bilaterally and symmetric in biceps, triceps, brachioradialis, and patellae; no ankle clonus bilaterally; negative Babinski bilaterally; negative Jalloh's bilaterally          Labs and Imaging    Significant labs - Hb increased to 8.3;     BMP    Recent Labs  Lab 12/14/17  0413 12/12/17  0434 12/11/17  0604 12/11/17  0035 12/10/17  0511   * 148*  --  144 141   POTASSIUM 4.8 4.2 4.4 4.0 4.7   CHLORIDE 116* 118*  --  114* 111*   CO2 22 20  --  19* 20   BUN 32* 31*  --  35* 27   CR 1.14 1.17  --  1.54* 1.25   IZABELLA 8.3* 8.1*  --  8.2* 7.7*       CBC    Recent Labs  Lab 12/14/17  0413 12/12/17  0847 12/11/17  1806 12/11/17  0604   WBC 5.5 7.8 6.9 9.3   HGB 8.1* 8.3* 7.1* 7.3*    189 200 200       COAGS    Recent Labs  Lab 12/11/17  0035 12/09/17  0641 12/08/17  1545   INR 1.31* 1.23* 1.25*   PTT 29  --  29   FIBR  --   --  364       ABG    Recent Labs  Lab 12/11/17  2051 12/10/17  1616   PH 7.39 7.42   PCO2 32* 26*   PO2 83 105   HCO3 20* 17*       CRP/ESRNo results for input(s): CRP in the last 168 hours.    Invalid input(s): ESR    CSFNo results for input(s): CGLU, CTP in the last 168 hours.    Invalid input(s): CCSF    MICRO    Recent Labs  Lab 12/11/17  0138 12/11/17  0130   CULT No growth after 3 days Canceled, Test creditedCanceled via EPIC interface       IMAGING:   reviewed             Plan   1. Neuro:   #LT MCA distribution stroke with LT ICA stenosis S/P Stenting on 12/9  - BP Goal <120  - Continue ASA and Plavix  - Continue atorva 40mg    # Agitation: Likely multifactorial due to expressive aphasia and continuous monitoring in ICU.  -Continue seroquel 25mg qam  -Increase to seroquel 50mg qpm      2. CVS: hemodynamically stable  - ECG sinus rhythm   - Maintain SBP < 120  - Hydralazine and labetolol PRN  - Lisinopril 20mg BID (increased to BID on 12/13)  - Continue amlodipine 5mg qday  - Continuous cardiac monitoring    3. Pulmonary: no issues  - Continuous pulse oximetry  - Supplemental oxygen PRN  - Incentive spirometry Q1H while awake    4. GI:   - Dysphagia Diet Level 2 - nectar thick liquids  - Bowel regimen. PRN anti-emetics.  - Protonix for ulcer ppx    5. Renal: no issues  -  Electrolyte replacement protocol;   - Continue to monitor intake/output  - Daily BMP    6. ID: afebrile, normal WBC count  - Continue to monitor for fevers and/or signs of infection    7.  Endocrine: DM neuropathy & retinopathy / Hasimoto's thyroiditis   - Endocrinology following, appreciate recs. On NPH insulin BID with SSI.    8. Heme:   #Autoimmune hemolytic anemia  -Prednisone 60mg qday  -Heme following, appreciate recs.   - Platelets > 100,000  - INR < 1.5  - Hemoglobin > 7  - Daily CBC    9. Prophylaxis  - DVT: SCDs while in bed  - GI: Protonix  - PT/OT    [facility]: [facility] versus [facility]    Nicolas Navarro MD  Neurology resident, PGY-2  (P) [phone]

## 2017-12-15 ENCOUNTER — APPOINTMENT (OUTPATIENT)
Dept: PHYSICAL THERAPY | Facility: CLINIC | Age: 72
DRG: 034 | End: 2017-12-15
Attending: PSYCHIATRY & NEUROLOGY
Payer: COMMERCIAL

## 2017-12-15 ENCOUNTER — APPOINTMENT (OUTPATIENT)
Dept: SPEECH THERAPY | Facility: CLINIC | Age: 72
DRG: 034 | End: 2017-12-15
Attending: PSYCHIATRY & NEUROLOGY
Payer: COMMERCIAL

## 2017-12-15 VITALS
OXYGEN SATURATION: 95 % | WEIGHT: 274.91 LBS | DIASTOLIC BLOOD PRESSURE: 76 MMHG | RESPIRATION RATE: 17 BRPM | SYSTOLIC BLOOD PRESSURE: 139 MMHG | BODY MASS INDEX: 39.36 KG/M2 | HEART RATE: 74 BPM | TEMPERATURE: 97.4 F | HEIGHT: 70 IN

## 2017-12-15 LAB
ABO + RH BLD: ABNORMAL
ABO + RH BLD: ABNORMAL
ANION GAP SERPL CALCULATED.3IONS-SCNC: 6 MMOL/L (ref 3–14)
BLD GP AB SCN SERPL QL: ABNORMAL
BLOOD BANK CMNT PATIENT-IMP: ABNORMAL
BLOOD BANK CMNT PATIENT-IMP: ABNORMAL
BUN SERPL-MCNC: 28 MG/DL (ref 7–30)
CALCIUM SERPL-MCNC: 8.7 MG/DL (ref 8.5–10.1)
CHLORIDE SERPL-SCNC: 117 MMOL/L (ref 94–109)
CO2 SERPL-SCNC: 25 MMOL/L (ref 20–32)
CREAT SERPL-MCNC: 1.06 MG/DL (ref 0.66–1.25)
ERYTHROCYTE [DISTWIDTH] IN BLOOD BY AUTOMATED COUNT: 20 % (ref 10–15)
FERRITIN SERPL-MCNC: 712 NG/ML (ref 26–388)
FOLATE SERPL-MCNC: 25.1 NG/ML
GFR SERPL CREATININE-BSD FRML MDRD: 69 ML/MIN/1.7M2
GLUCOSE BLDC GLUCOMTR-MCNC: 155 MG/DL (ref 70–99)
GLUCOSE BLDC GLUCOMTR-MCNC: 418 MG/DL (ref 70–99)
GLUCOSE SERPL-MCNC: 118 MG/DL (ref 70–99)
HCT VFR BLD AUTO: 26.5 % (ref 40–53)
HGB BLD-MCNC: 8.8 G/DL (ref 13.3–17.7)
IRON SATN MFR SERPL: 59 % (ref 15–46)
IRON SERPL-MCNC: 122 UG/DL (ref 35–180)
LDH SERPL L TO P-CCNC: 290 U/L (ref 85–227)
MCH RBC QN AUTO: 32.6 PG (ref 26.5–33)
MCHC RBC AUTO-ENTMCNC: 33.2 G/DL (ref 31.5–36.5)
MCV RBC AUTO: 98 FL (ref 78–100)
PLATELET # BLD AUTO: 179 10E9/L (ref 150–450)
POTASSIUM SERPL-SCNC: 4.3 MMOL/L (ref 3.4–5.3)
RBC # BLD AUTO: 2.7 10E12/L (ref 4.4–5.9)
RETICS # AUTO: 352.2 10E9/L (ref 25–95)
RETICS/RBC NFR AUTO: 12.7 % (ref 0.5–2)
SODIUM SERPL-SCNC: 148 MMOL/L (ref 133–144)
SPECIMEN EXP DATE BLD: ABNORMAL
TIBC SERPL-MCNC: 206 UG/DL (ref 240–430)
VIT B12 SERPL-MCNC: 619 PG/ML (ref 193–986)
WBC # BLD AUTO: 5.5 10E9/L (ref 4–11)

## 2017-12-15 PROCEDURE — 83540 ASSAY OF IRON: CPT | Performed by: INTERNAL MEDICINE

## 2017-12-15 PROCEDURE — 25000132 ZZH RX MED GY IP 250 OP 250 PS 637: Performed by: PSYCHIATRY & NEUROLOGY

## 2017-12-15 PROCEDURE — 97116 GAIT TRAINING THERAPY: CPT | Mod: GP

## 2017-12-15 PROCEDURE — 25000132 ZZH RX MED GY IP 250 OP 250 PS 637: Performed by: STUDENT IN AN ORGANIZED HEALTH CARE EDUCATION/TRAINING PROGRAM

## 2017-12-15 PROCEDURE — 25000131 ZZH RX MED GY IP 250 OP 636 PS 637: Performed by: STUDENT IN AN ORGANIZED HEALTH CARE EDUCATION/TRAINING PROGRAM

## 2017-12-15 PROCEDURE — 83550 IRON BINDING TEST: CPT | Performed by: INTERNAL MEDICINE

## 2017-12-15 PROCEDURE — 82607 VITAMIN B-12: CPT | Performed by: INTERNAL MEDICINE

## 2017-12-15 PROCEDURE — 86900 BLOOD TYPING SEROLOGIC ABO: CPT | Performed by: PSYCHIATRY & NEUROLOGY

## 2017-12-15 PROCEDURE — 82746 ASSAY OF FOLIC ACID SERUM: CPT | Performed by: INTERNAL MEDICINE

## 2017-12-15 PROCEDURE — 85027 COMPLETE CBC AUTOMATED: CPT | Performed by: INTERNAL MEDICINE

## 2017-12-15 PROCEDURE — 40000193 ZZH STATISTIC PT WARD VISIT

## 2017-12-15 PROCEDURE — 25000125 ZZHC RX 250: Performed by: STUDENT IN AN ORGANIZED HEALTH CARE EDUCATION/TRAINING PROGRAM

## 2017-12-15 PROCEDURE — 82728 ASSAY OF FERRITIN: CPT | Performed by: INTERNAL MEDICINE

## 2017-12-15 PROCEDURE — 97530 THERAPEUTIC ACTIVITIES: CPT | Mod: GP

## 2017-12-15 PROCEDURE — 25000131 ZZH RX MED GY IP 250 OP 636 PS 637: Performed by: NURSE PRACTITIONER

## 2017-12-15 PROCEDURE — 40000225 ZZH STATISTIC SLP WARD VISIT: Performed by: SPEECH-LANGUAGE PATHOLOGIST

## 2017-12-15 PROCEDURE — 92507 TX SP LANG VOICE COMM INDIV: CPT | Mod: GN | Performed by: SPEECH-LANGUAGE PATHOLOGIST

## 2017-12-15 PROCEDURE — 86901 BLOOD TYPING SEROLOGIC RH(D): CPT | Performed by: PSYCHIATRY & NEUROLOGY

## 2017-12-15 PROCEDURE — 83615 LACTATE (LD) (LDH) ENZYME: CPT | Performed by: INTERNAL MEDICINE

## 2017-12-15 PROCEDURE — 85045 AUTOMATED RETICULOCYTE COUNT: CPT | Performed by: INTERNAL MEDICINE

## 2017-12-15 PROCEDURE — 00000146 ZZHCL STATISTIC GLUCOSE BY METER IP

## 2017-12-15 PROCEDURE — 36415 COLL VENOUS BLD VENIPUNCTURE: CPT | Performed by: INTERNAL MEDICINE

## 2017-12-15 PROCEDURE — 92526 ORAL FUNCTION THERAPY: CPT | Mod: GN | Performed by: SPEECH-LANGUAGE PATHOLOGIST

## 2017-12-15 PROCEDURE — 25000132 ZZH RX MED GY IP 250 OP 250 PS 637: Performed by: NURSE PRACTITIONER

## 2017-12-15 PROCEDURE — 80048 BASIC METABOLIC PNL TOTAL CA: CPT | Performed by: INTERNAL MEDICINE

## 2017-12-15 PROCEDURE — 83010 ASSAY OF HAPTOGLOBIN QUANT: CPT | Performed by: INTERNAL MEDICINE

## 2017-12-15 PROCEDURE — 86850 RBC ANTIBODY SCREEN: CPT | Performed by: PSYCHIATRY & NEUROLOGY

## 2017-12-15 RX ORDER — ATORVASTATIN CALCIUM 40 MG/1
40 TABLET, FILM COATED ORAL DAILY
Qty: 30 TABLET | DISCHARGE
Start: 2017-12-16

## 2017-12-15 RX ORDER — AMLODIPINE BESYLATE 5 MG/1
5 TABLET ORAL DAILY
Qty: 30 TABLET | DISCHARGE
Start: 2017-12-16

## 2017-12-15 RX ORDER — FOLIC ACID 1 MG/1
1 TABLET ORAL DAILY
Qty: 30 TABLET | DISCHARGE
Start: 2017-12-16

## 2017-12-15 RX ORDER — POLYETHYLENE GLYCOL 3350 17 G/17G
17 POWDER, FOR SOLUTION ORAL DAILY
Qty: 7 PACKET | DISCHARGE
Start: 2017-12-16

## 2017-12-15 RX ORDER — ASPIRIN 325 MG
325 TABLET ORAL DAILY
Qty: 120 TABLET | DISCHARGE
Start: 2017-12-16

## 2017-12-15 RX ORDER — PANTOPRAZOLE SODIUM 40 MG/1
40 TABLET, DELAYED RELEASE ORAL EVERY MORNING
Qty: 30 TABLET | DISCHARGE
Start: 2017-12-16

## 2017-12-15 RX ORDER — PREDNISONE 20 MG/1
TABLET ORAL
DISCHARGE
Start: 2017-12-15

## 2017-12-15 RX ORDER — LEVOTHYROXINE SODIUM 175 UG/1
175 TABLET ORAL
Qty: 30 TABLET | DISCHARGE
Start: 2017-12-16

## 2017-12-15 RX ORDER — AMOXICILLIN 250 MG
2 CAPSULE ORAL 2 TIMES DAILY
Qty: 100 TABLET | DISCHARGE
Start: 2017-12-15

## 2017-12-15 RX ORDER — LISINOPRIL 20 MG/1
20 TABLET ORAL 2 TIMES DAILY
Qty: 30 TABLET | DISCHARGE
Start: 2017-12-15

## 2017-12-15 RX ORDER — QUETIAPINE FUMARATE 50 MG/1
50 TABLET, FILM COATED ORAL EVERY EVENING
Qty: 120 TABLET | DISCHARGE
Start: 2017-12-15

## 2017-12-15 RX ORDER — QUETIAPINE FUMARATE 25 MG/1
25 TABLET, FILM COATED ORAL DAILY
Qty: 60 TABLET | DISCHARGE
Start: 2017-12-16

## 2017-12-15 RX ORDER — ALBUTEROL SULFATE 90 UG/1
2 AEROSOL, METERED RESPIRATORY (INHALATION) EVERY 4 HOURS PRN
DISCHARGE
Start: 2017-12-15

## 2017-12-15 RX ORDER — CLOPIDOGREL BISULFATE 75 MG/1
75 TABLET ORAL DAILY
Qty: 30 TABLET | DISCHARGE
Start: 2017-12-16

## 2017-12-15 RX ADMIN — ASPIRIN 325 MG ORAL TABLET 325 MG: 325 PILL ORAL at 08:42

## 2017-12-15 RX ADMIN — LEVOTHYROXINE SODIUM 175 MCG: 50 TABLET ORAL at 08:42

## 2017-12-15 RX ADMIN — FOLIC ACID 1 MG: 1 TABLET ORAL at 08:42

## 2017-12-15 RX ADMIN — LISINOPRIL 20 MG: 20 TABLET ORAL at 08:42

## 2017-12-15 RX ADMIN — AMLODIPINE BESYLATE 5 MG: 2.5 TABLET ORAL at 08:41

## 2017-12-15 RX ADMIN — PREDNISONE 60 MG: 50 TABLET ORAL at 08:42

## 2017-12-15 RX ADMIN — QUETIAPINE FUMARATE 25 MG: 25 TABLET ORAL at 08:42

## 2017-12-15 RX ADMIN — VITAMIN D, TAB 1000IU (100/BT) 1000 UNITS: 25 TAB at 08:43

## 2017-12-15 RX ADMIN — CLOPIDOGREL 75 MG: 75 TABLET, FILM COATED ORAL at 08:41

## 2017-12-15 RX ADMIN — PANTOPRAZOLE SODIUM 40 MG: 40 TABLET, DELAYED RELEASE ORAL at 08:42

## 2017-12-15 RX ADMIN — ATORVASTATIN CALCIUM 40 MG: 40 TABLET, FILM COATED ORAL at 08:42

## 2017-12-15 RX ADMIN — SENNOSIDES AND DOCUSATE SODIUM 2 TABLET: 8.6; 5 TABLET ORAL at 08:43

## 2017-12-15 RX ADMIN — INSULIN ASPART 12 UNITS: 100 INJECTION, SOLUTION INTRAVENOUS; SUBCUTANEOUS at 13:26

## 2017-12-15 ASSESSMENT — VISUAL ACUITY
OU: GLASSES;NORMAL ACUITY

## 2017-12-15 NOTE — PLAN OF CARE
Problem: Patient Care Overview  Goal: Plan of Care/Patient Progress Review  Outcome: Therapy, progress toward functional goals as expected  Discharge Planner SLP   Patient plan for discharge: TCU  Current status: Patient seen for swallowing and communication treatment with spouse present. Patient up in chair and consumed 3 small ice chips, 5 sips water by spoon and 1 sip water by cup and 1 sip nectar thick liquid by cup. Note immediate throat clear and cough after sip of water by cup. No other overt aspiration signs occurred with small sips of water by spoon, ice chips or nectar thick liquid. Patient followed 1 and 2 step spoken whole body commands with 100% accuracy. Patient responded to spoken bipolar yes/no questions pertaining to personal/biographical, personal/environmental and nonpersonal general knowledge information with 100% accuracy. Note increased difficulty with general knowledge grammatically complex information with 60% accuracy (chance level=25% for bipolar questions). Patient named common objects in room with 17% accuracy but improved to 83% accuracy given semantic cue with carrier phrase. Patient counted 1-10 independently. Note difficulty with days of the week (14%) and months of the year (50%). Patient attempting to communicate verbally with significant word-finding difficulty noted. Patient also using gestures and demonstrating good social nonverbal interaction. Recommend continue current dysphagia diet level 2 with nectar thick liquids at this time given swallow precautions (fully upright position, small bites/sips, slow rate).  Barriers to return to prior living situation: Dysphagia, severity of language deficits.   Recommendations for discharge: TCU  Rationale for recommendations: Pt will likely require SLP services to optimize speech/language functions following discharge from this hospitalization.       Entered by: Maya Mays 12/15/2017 12:42 PM

## 2017-12-15 NOTE — PROGRESS NOTES
Social Work Services Progress Note    Hospital Day: 8 -- SEE SW DISCHARGE NOTE FOR FINAL D/C DETAILS.   Date of Initial Social Work Evaluation:  12/11/17  Collaborated with:  Pt/wife, chart review, attended rounds, neuro fellow     Data:  Per discussion with neuro fellow, pt is ready for d/c to rehab. [facility] and [facility]ic[facility] updating that pt is ready for d/c and to start referral process, as she told this writer yesterday that they will not look at a referral until pt is ready for d/c. Also received call back from ELBERT Mayes this morning (this writer left her two voicemails yesterday) and she emailed SW the VA-contract list, noting that pt has eligibility for VA-contracted facility.     Met with pt and wife at the bedside and provided VA-contracted facility list. Discussed that per MD, pt is ready for discharge to TCU today. Noted that I had already called and left message for the [facility] noting that pt is ready for discharge and that we would like to start referral. Explained that this writer was told yesterday by [facility] that they would not look at a referral until pt is ready for d/c, so could not send referral yesterday. Noted that VA also has 24 hours to review referral, so that this writer suspected that they would not be able to accept pt today. Requested that they select back-up options for this reason. Answered several questions about discharge and referral process again. Pt's breakfast arrived and he had to use the urinal, so SW stepped out and said we could touch base again in a little bit, to which pt/wife were agreeable. Received voicemail a few minutes later from pt's wife stating they're interested in [facility] and [facility] as back-up options, as these would be closer to home.     Referrals:   - Received call back from [facility] at the [facility], who provided fax number for SW to send referral. Faxed as requested.   - Faxed referral to [facility] (Ph. [phone],  F. [phone]). Spoke with Radha in admissions who will review.   - Faxed referral to [facility] (Ph. [phone],  F. [phone]). Spoke with Earle about referral.     Updates:  - [facility] and [facility] are both able to accept pt today. Radha at [facility] was wondering if pt had Medicare in addition to his VA insurance. Noted I would check with pt, as we do not have additional insurance listed on Facesheet.   - Tentatively set up earliest w/c ride available via [employer] (Ph. [phone]) at 5:45p.   - Updated pt/wife of tentative d/c time and requested that they give choice between two accepting facilities. Also inquired about Medicare and pt denied that he had this coverage. Pt's wife called ELBERT back around 1p stating they would like [facility].   - Spoke with [facility] at the [facility] again and she said they are still reviewing referral but they would NOT be able to accept him for admission today. Obtained info for VA transportation services (Ph. [phone]) from Arianne to see if transport can be arranged through VA.   - Called VA transportation services and was transferred to their transportation referrals office, spoke with Yolanda to make referral (Ph. [phone]). Approval needs to be granted by [facility]GE and they will then call ELBERT.   - Received call back from Fatou at the [facility] stating that pt will not have transportation because he doesn't have a VA contract at [facility] and also that pt has Medicare and would need to admit under this benefit for the first 20 days and then VA contract would start. She also noted that pt would not have transportation coverage through VA if admitting under Medicare benefit. She suggested SW call Maria G Mayes at the [facility] again to confirm contract.   - Called ELBERT Mayes (Ph. [phone]) at [facility] and discussed situation. She noted she would see what she could do with their transportation department.   - As of 4:00PM, have not gotten any  confirmation of transportation from [facility], so will move forward with ride via [facility] at 5:45p. Discussed earlier with wife today that SW was working on trying to get transportation approved and set up via [facility], but noted that if this could not be done, ride would be via [facility]. Wife expressed understanding.     Intervention:  Discharge planning, TCU referrals, coordination with VA and outside facilities     Assessment:  Pt seems to be able to speak a little bit more today and was somewhat engaged in d/c planning process. Wife expressed feeling overwhelmed that d/c is happening so soon, despite days of discussion and education. They seemed to be more at ease as the day progressed and plans came together.     Plan:    Anticipated Disposition:  TCU    Barriers to d/c plan:  Bed availability     Follow Up:  SW will continue to follow, support and assist with ongoing social service and discharge planning needs.     LEODAN Vo, [facility]  ICU Float   Pager: [phone]  [email or web address]     NO LETTER

## 2017-12-15 NOTE — PLAN OF CARE
Neuro: AOX1, PERRLA. Follows commands on all extremities. Right facial droop present and tongue weakness present. Expressive aphasia present.      CV: SR HR 70s. BP stable.  Generalized edema.     Resp: Sounds clear with diminished bases. On room air sating > 95%.       GI: Positive bowel sounds. DD2 diet. No BM.      : Adequate urine production using a urinal. Urine color grace.        Skin: Intact other bruising.     Access: Right arm PIV saline locked..         Plan: Neuro checks, hemodynamic monitoring and notifying the MD with any concerns.

## 2017-12-15 NOTE — PLAN OF CARE
Problem: Patient Care Overview  Goal: Plan of Care/Patient Progress Review  Discharge Planner PT   [facility]: [facility]  Current status: Engaged pt in functional transfers at [facility] and gait with FWW ~400ft at [facility]. Pt on RA with sats 97%, but reporting CORRIGAN. Wife present and supportive. Pt continues with expressive aphasia, but intermittent ability to word find.   Barriers to return to prior living situation: medical status, ability of wife to physically assist  Recommendations for discharge: TCU  Rationale for recommendations: Pt is below baseline for functional mobility and will required therapy for safe return to community       Entered by: Rhina Liu 12/15/2017 4:44 PM

## 2017-12-15 NOTE — PROGRESS NOTES
Diabetes Consult Daily  Progress Note          Assessment/Plan:                          Seven Cat is a 72 year old male with history of  Diabetes, hypertension, hyperlipidemia, Hashimoto's thyroiditis,autoimmune hemolytic anemia ( blood transfusion for hemolysis), presented from [facility] after receiving tPA for left MCA stroke (12/9/2017).       Diabetic: a1C 7.3% (12/8/2017), value may not be accurate if receiving frequent blood  transfusion   Plan for Discharge:  - decrease NPH from  36 units to 30 unitsAM ( with prednisone 60 mg)  - decrease NPH  From 36 to 30 units units PM ( given at 1900)  -  decreased Novolog  meal insulin at 1 unit per 5 grams of CHO with meals and snacks/supplements   - d/c Novolog very high correction scale before meals and HS  -change to high correction scale before meals and HS, 1 unit per 20 > 140 before meals and > 200 HS  -monitor glucose before meals HS and 0200                       Will need close follow-up with either PCP and/or Endocrinology once steroids taper for dose adjustment         Plan discussed with bedside RN  Plan for discharge to TCU today                Interval History:   The last 24 hours progress and nursing notes reviewed.  Blood sugars have been slowly improving.  It is unclear the  timing of insulin. Seems to have missed prandial insulin.  Today, breakfast ate 83 grams of CHO, per previous order ( 1 unit per 3 grams of CHO, should have received ~ 27 units of insuiln) was given 2 units of insulin ( RN thought in CHO units and not in grams of CHO). Patient refused additional insuiln    Appetite is great, denies nausea and or vomiting      Recent Labs  Lab 12/15/17  1004 12/15/17  0447 12/14/17  2227 12/14/17  2033 12/14/17  1805 12/14/17  1206 12/14/17  0756 12/14/17  0413  12/12/17  0434  12/11/17  0035  12/10/17  0511  12/09/17  0641   GLC  --  118*  --   --   --   --   --  274*  --  146*  --  91  --  360*  --  161*   *   "--  280* 212* 180* 257* 377*  --   < >  --   < >  --   < >  --   < >  --    < > = values in this interval not displayed.            Review of Systems:   See interval hx          Medications:       Active Diet Order      Combination Diet 9958-4308 Calories: Moderate Consistent CHO (4-6 CHO units/meal); Nectar Thickened Liquids (pre-thickened or use instant food thickener); Dysphagia Diet Level 2: Mechan Altered; Nectar Thickened Liquids (pre-thickened or use insta...     Physical Exam:  Gen: Alert,  NAD, sitting in chair  HEENT: mucous membranes are moist  Resp: Unlabored  Ext: moving extremities  Neuro:oriented x3, expressive aphasia  /68 (BP Location: Left arm)  Pulse 74  Temp 97.6  F (36.4  C) (Axillary)  Resp 16  Ht 1.778 m (5' 10\")  Wt 124.7 kg (274 lb 14.6 oz)  SpO2 97%  BMI 39.45 kg/m2           Data:     Lab Results   Component Value Date    A1C 7.3 12/08/2017              CBC RESULTS:   Recent Labs   Lab Test  12/15/17   0447   WBC  5.5   RBC  2.70*   HGB  8.8*   HCT  26.5*   MCV  98   MCH  32.6   MCHC  33.2   RDW  20.0*   PLT  179     Recent Labs   Lab Test  12/15/17   0447  12/14/17   0413   NA  148*  146*   POTASSIUM  4.3  4.8   CHLORIDE  117*  116*   CO2  25  22   ANIONGAP  6  8   GLC  118*  274*   BUN  28  32*   CR  1.06  1.14   IZABELLA  8.7  8.3*     Liver Function Studies -   Recent Labs   Lab Test  12/08/17   1545   PROTTOTAL  6.9   ALBUMIN  3.2*   BILITOTAL  2.8*   ALKPHOS  103   AST  23   ALT  25     Lab Results   Component Value Date    INR 1.31 12/11/2017    INR 1.23 12/09/2017    INR 1.25 12/08/2017             Giovanna Mckay [phone]  Diabetes Management job code [id number]            "

## 2017-12-15 NOTE — PROGRESS NOTES
Social Work Services Discharge Note      Patient Name:  Seven Cat     Anticipated Discharge Date:  12/15/17 at 5:45p via [employer] (Ph. [phone])    Discharge Disposition:   TCU:  [facility] (Ph. [phone], F. [phone])    Following MD:  House     Pre-Admission Screening (PAS) online form has been completed.  The Level of Care (LOC) is:  Determined  Confirmation Code is:  [id number]  Patient/caregiver informed of referral to [facility] Line for Pre-Admission Screening for skilled nursing facility (SNF) placement and to expect a phone call post discharge from SNF.     Additional Services/Equipment Arranged:  None.     Patient / Family response to discharge plan:  Agreeable     Persons notified of above discharge plan:  Pt/family, neuro fellow, bedside RN, charge RN & NST, TCU    Staff Discharge Instructions:  Please fax discharge orders and signed hard scripts for any controlled substances.  Please print a packet and send with patient.     CTS Handoff completed:  YES     Medicare Notice of Rights provided to the patient/family:  NO, pt here on [facility] insurance.     LEODAN Vo, [facility]  ICU Float   Pager: [phone]  [email or web address]     NO LETTER

## 2017-12-15 NOTE — PROGRESS NOTES
[facility]  NeuroICU Daily ICU Note:         Chief Complaints   Seven Cat is a 72 year old man with past medical history significant for autoimmune hemolytic anemia, Hashimoto's thyroiditis, DM, Hypertension, and hyperlipidemia presenting with Lt MCA stroke, s/p Stent on 11/9.          Subjective (24 hours events)     Patient again had a good night. No acute events.           Objective   Temp:  [97.6  F (36.4  C)-99  F (37.2  C)] 97.6  F (36.4  C)  Heart Rate:  [] 93  Resp:  [10-35] 16  BP: ()/() 128/68  SpO2:  [94 %-99 %] 97 %    No Data Recorded    Resp: 16    I/O last 3 completed shifts:  In: 476 [P.O.:476]  Out: 1925 [Urine:1925]    Physical Exam  General: NAD, lying comfortably in bed  Mental Status:   CRANIAL NERVES:  Pupils equal, round and reactive to light. Extraocular movements full. Visual fields full. Face moves symmetrically. Tongue midline. Hearing intact to finger rubbing.   NEUROLOGIC: Motor strength 5/5, reflexes 2/4. Toe signs are down-going. Good finger-nose-finger, fine finger movement, and heel-shin maneuvers. Gait normal-based.       Delt Bi Tri WE WF    R 5 5 5 5 5 5   L 5 5 5 5 5 5    IP Quad Ham DF PF EHL   R 5 5- 5- 5 5 5   L 5 5 5 5 5 5     Sensory: symmetrically intact to light touch x4 extremities.     Reflexes: 2+ bilaterally and symmetric in biceps, triceps, brachioradialis, and patellae; no ankle clonus bilaterally; negative Babinski bilaterally; negative Jalloh's bilaterally          Labs and Imaging   Significant labs - Hb increased to 8.3;     BMP    Recent Labs  Lab 12/15/17  0447 12/14/17  0413 12/12/17  0434 12/11/17  0604 12/11/17  0035   * 146* 148*  --  144   POTASSIUM 4.3 4.8 4.2 4.4 4.0   CHLORIDE 117* 116* 118*  --  114*   CO2 25 22 20  --  19*   BUN 28 32* 31*  --  35*   CR 1.06 1.14 1.17  --  1.54*   IZABELLA 8.7 8.3* 8.1*  --  8.2*       CBC    Recent Labs  Lab 12/15/17  0447 12/14/17  0413 12/12/17  0828  12/11/17  1806   WBC 5.5 5.5 7.8 6.9   HGB 8.8* 8.1* 8.3* 7.1*    189 189 200       COAGS    Recent Labs  Lab 12/11/17  0035 12/09/17  0641 12/08/17  1545   INR 1.31* 1.23* 1.25*   PTT 29  --  29   FIBR  --   --  364       ABG    Recent Labs  Lab 12/11/17  2051 12/10/17  1616   PH 7.39 7.42   PCO2 32* 26*   PO2 83 105   HCO3 20* 17*       CRP/ESRNo results for input(s): CRP in the last 168 hours.    Invalid input(s): ESR    CSFNo results for input(s): CGLU, CTP in the last 168 hours.    Invalid input(s): CCSF    MICRO    Recent Labs  Lab 12/11/17  0138 12/11/17  0130   CULT No growth after 4 days Canceled, Test creditedCanceled via EPIC interface       IMAGING:   reviewed             Plan   1. Neuro:   #LT MCA distribution stroke with LT ICA stenosis S/P Stenting on 12/9  - BP Goal <120  - Continue ASA and Plavix  - Continue atorva 40mg    # Agitation: Likely multifactorial due to expressive aphasia and continuous monitoring in ICU.  -Continue seroquel 25mg qam  -Increase to seroquel 50mg qpm      2. CVS: hemodynamically stable  - ECG sinus rhythm   - Maintain SBP < 120  - Hydralazine and labetolol PRN  - Lisinopril 20mg BID (increased to BID on 12/13)  - Continue amlodipine 5mg qday  - Continuous cardiac monitoring    3. Pulmonary: no issues  - Continuous pulse oximetry  - Supplemental oxygen PRN  - Incentive spirometry Q1H while awake    4. GI:   - Dysphagia Diet Level 2 - nectar thick liquids  - Bowel regimen. PRN anti-emetics.  - Protonix for ulcer ppx    5. Renal: no issues  - Electrolyte replacement protocol;   - Continue to monitor intake/output  - Daily BMP    6. ID: afebrile, normal WBC count  - Continue to monitor for fevers and/or signs of infection    7.  Endocrine: DM neuropathy & retinopathy / Hasimoto's thyroiditis   - Endocrinology following, appreciate recs. On NPH insulin BID with SSI.    8. Heme:   #Autoimmune hemolytic anemia  -Prednisone 60mg qday, planned taper.  Per Heme/Onc:  Recommend  decreasing prednisone to 40 mg BID starting 12/16, for 7 days duration, then 60 mg QD for 7 days, then 40 mg QD for 7 days, then 20 mg QD for 7 days, then discontinue.  During taper, Mr. Cat should receive CBCs and reticulocyte count checks at least weekly, with any significant decrease in hemoglobin further investigated in coordination with Hematology.  Patient should remain on a PPI during this time given concurrent need for ASA.  PJP prophylaxis could also be considered, and if steroid taper is extended, should be implemented.  Recommend follow-up with patients Hematologist, Dr. Lino, 4 weeks after hospital dismissal.    - Platelets > 100,000  - INR < 1.5  - Hemoglobin > 7  - Daily CBC    9. Prophylaxis  - DVT: SCDs while in bed  - GI: Protonix  - PT/OT    Disposition: [facility] versus [facility]    Nicolas Navarro MD  Neurology resident, PGY-2  (P) [phone]

## 2017-12-15 NOTE — PLAN OF CARE
Problem: Patient Care Overview  Goal: Plan of Care/Patient Progress Review  Occupational Therapy Discharge Summary    Reason for therapy discharge:    Discharged to transitional care facility.    Progress towards therapy goal(s). See goals on Care Plan in [facility] electronic health record for goal details.  Goals partially met.  Barriers to achieving goals:   discharge from facility.    Therapy recommendation(s):    Continued therapy is recommended.  Rationale/Recommendations:  to increase I with ADLs, functional mobility and transfers for eventual safe discharge home..

## 2017-12-15 NOTE — CONSULTS
[facility]   PM&R CONSULT    Consulting Provider: Daren  Reason for Consult: Assessment of rehabilitation   Location of Patient: 4B  Date of Encounter: 12/15/2017   Date of Admission: 12/8/2017    ASSESSMENT/PLAN:    Mr. Seven Cat is a right handed 72 year old male with a past medical history significant for autoimmune hemolytic anemia, Hashimoto's thyroiditis, DM with neuropathy and retinopathy, HTN, HLD, BPH, who is admitted for a left MCA CVA sustained on 12/8 s/p tPA and ICA stenting.  He has functional impairments including expressive aphasia, dysphagia, gait impairment, and agitation.  While patient did have some baseline impairment in gait due to his neuropathy, he was previously independent with ADLs and IADLs, is tolerating therapies well, and may be ARU appropriate given his level of medical complexity with ongoing physician oversight needed for AIHA and agitation management.  [facility]'s [facility] or [facility] a [facility] closer to home, so if [facility] is not [facility] and primary team feels patient is ready to discharge, would recommend TCU placement.    HPI:    Patient is a 71 yo male with a past medical history significant for autoimmune hemolytic anemia, Hashimoto's thyroiditis, DM with neuropathy and retinopathy, HTN, HLD, BPH, who on 12/8 developed symptoms of confusion, dysarthria, right facial droop [facility] (Hgb 5.5) related to his autoimmune hemolytic anemia.  NIHSS was 4.  He was found to have a left MCA distribution CVA with left ICA stenosis and was given tPA then transferred to [facility] for continued cares.  Symptoms improved immediately following tPA but then developed agitation and return of expressive aphasia, Repeat HCT showed left frontal opercular infarct, and he underwent left ICA stenting on 12/9.  A1c 7.3%. LDL 49.    Patient has been agitated and is receiving seroquel which was increased to 25 mg  QAM and 50 mg QPM last evening.  Has Haldol and Zyprexa ordered PRN.  Haldol was last received on 12/13 and it does not appear that he has received any Zyprexa.  No leukocytosis, fevers, and UA with no evidence of infection.  Had been on 1:1 sitter which was removed last evening and per wife patient has been doing well without sitter.  She thinks his agitation is primarily due to overstimulation when too many people are in the room or when he needs to urinate but is unable to express that and unable to get up due to IV lines.    He has been seen by Heme/Onc with recommendations for a prednisone taper and weekly labs until outpatient hematology follow-up.  Has been seen by Endocrine for management of diabetes.    History limited by expressive aphasia.  Most of information taken from wife and chart review.    PREVIOUS LEVEL OF FUNCTION   Was using cane, walker, and manual wheelchair at home PTA.  Walked without AD for short  household distance only and would primarily use cane for outdoor distances <1 block.  Used manual wheelchair for community outings.  Gait disability primarily due to neuropathy pain and balance difficulties.  Able to do ADLs with walk in shower.  Wife put pills in pill box, but patient managed insulin and finances.        CURRENT FUNCTION   SLP: Pt seen for dysphagia and aphasia therapy. Pt continues to demonstrate significant expressive deficits. He does have some spontaneous verbalizations this date. He followed simple verbal commands. Pt attempting to communicate with gestures and with his communication board. Pt demonstrates decreased oral manipulation of bolus. Recommend pt continue on Dysphagia diet level 2 with nectar thickened liquids. Sit pt upright for po intake. Encourage small bites/sips and slow rate. Ask pt simple yes/no questions as he is most successful with these. Recommending TCU.  OT: pt continues to have expressive aphasia- difficulty speaking and writing, Pt able to walk to  bathroom with walker and SBA for simple g/h tasks.  Recommending TCU.  PT: pt has good strength in UE and LE with slight R fascial droop. Pt is able to move in bed and transferred sit to stand from bed and stood for several minutes. Pt took steps bed to chair with use of ww. Pt able to move well. Pt educated in starting to walk at next PT session. Pt with expressive aphasia and he gets frustrated.  Recommending home with assist and OP therapies vs ARU depending on progress (has not been seen since 12/12)  Nursing:  Patient has been requiring 1:1 sitter, discontinued last evening    LIVING SITUATION/SUPPORT  Lives with wife, Radha in a 4 level split house with ramps present.  No NATASHA from patio (ramped), bed/bath are on this lower level and 7 stairs up to kitchen/dining room.  Has two daughters, one lives locally and one in [place].  Does not have good relationship with local daughter and she is unlikely to provide much assistance upon discharge.  Have some neighbors and Shinto friends who could help.  Wife had been working full time prior to his CVA, currently not working.    SOCIAL HISTORY  Social History     Social History     Marital status:      Spouse name: N/A     Number of children: N/A     Years of education: N/A     Social History Main Topics     Smoking status: Not on file     Smokeless tobacco: Not on file     Alcohol use Not on file     Drug use: Not on file     Sexual activity: Not on file     Other Topics Concern     Not on file     Social History Narrative     Past Medical History:  autoimmune hemolytic anemia, Hashimoto's thyroiditis, DM with neuropathy and retinopathy, HTN, HLD, BPH    Current Medications:  Current Facility-Administered Medications   Medication     insulin isophane human (HumuLIN N PEN) injection 36 Units     insulin isophane human (HumuLIN N PEN) injection 36 Units     insulin aspart (NovoLOG) inj (RAPID ACTING)     insulin aspart (NovoLOG) inj (RAPID ACTING)     insulin aspart  (NovoLOG) inj (RAPID ACTING)     amLODIPine (NORVASC) tablet 5 mg     haloperidol lactate (HALDOL) injection 5 mg     QUEtiapine (SEROquel) tablet 25 mg     QUEtiapine (SEROquel) tablet 50 mg     lisinopril (PRINIVIL/ZESTRIL) tablet 20 mg     cholecalciferol (vitamin D3) tablet 1,000 Units     folic acid (FOLVITE) tablet 1 mg     potassium chloride 10 mEq in 100 mL intermittent infusion with 10 mg lidocaine     insulin aspart (NovoLOG) inj (RAPID ACTING)     dextrose 10 % 1,000 mL infusion     glucose 40 % gel 15-30 g    Or     dextrose 50 % injection 25-50 mL    Or     glucagon injection 1 mg     clopidogrel (PLAVIX) tablet 75 mg     pantoprazole (PROTONIX) EC tablet 40 mg     albuterol (PROAIR HFA/PROVENTIL HFA/VENTOLIN HFA) Inhaler 2 puff     senna-docusate (SENOKOT-S;PERICOLACE) 8.6-50 MG per tablet 2 tablet     polyethylene glycol (MIRALAX/GLYCOLAX) Packet 17 g     OLANZapine (zyPREXA) tablet 5 mg     atorvastatin (LIPITOR) tablet 40 mg     aspirin tablet 325 mg     levothyroxine (SYNTHROID/LEVOTHROID) tablet 175 mcg     predniSONE (DELTASONE) tablet 60 mg     ondansetron (ZOFRAN-ODT) ODT tab 4 mg    Or     ondansetron (ZOFRAN) injection 4 mg     prochlorperazine (COMPAZINE) injection 5 mg    Or     prochlorperazine (COMPAZINE) tablet 5 mg    Or     prochlorperazine (COMPAZINE) Suppository 12.5 mg     metoclopramide (REGLAN) tablet 5 mg    Or     metoclopramide (REGLAN) injection 5 mg     hydrALAZINE (APRESOLINE) injection 10 mg     labetalol (NORMODYNE/TRANDATE) injection 10 mg     naloxone (NARCAN) injection 0.1-0.4 mg     lidocaine 1 % 0.5-5 mL     lidocaine (LMX4) kit     potassium chloride SA (K-DUR/KLOR-CON M) CR tablet 20-40 mEq     potassium chloride (KLOR-CON) Packet 20-40 mEq     potassium chloride 10 mEq in 100 mL intermittent infusion with 10 mg lidocaine     magnesium sulfate 4 g in 100 mL sterile water (premade)     potassium phosphate 15 mmol in NaCl 0.9 % 250 mL intermittent infusion      "potassium phosphate 20 mmol in NaCl 0.9 % 500 mL intermittent infusion     potassium phosphate 20 mmol in NaCl 0.9 % 250 mL intermittent infusion     potassium phosphate 25 mmol in NaCl 0.9 % 500 mL intermittent infusion     lidocaine 1 % 1 mL     lidocaine (LMX4) kit     sodium chloride (PF) 0.9% PF flush 3 mL     sodium chloride (PF) 0.9% PF flush 3 mL       Review of Systems:  ROS deferred due to expressive aphasia.    Labs   Lab Results   Component Value Date    WBC 5.5 12/15/2017    HGB 8.8 (L) 12/15/2017    HCT 26.5 (L) 12/15/2017    MCV 98 12/15/2017     12/15/2017     Lab Results   Component Value Date     (H) 12/15/2017    POTASSIUM 4.3 12/15/2017    CHLORIDE 117 (H) 12/15/2017    CO2 25 12/15/2017     (H) 12/15/2017     Lab Results   Component Value Date    GFRESTIMATED 69 12/15/2017    GFRESTBLACK 84 12/15/2017     Lab Results   Component Value Date    AST 23 12/08/2017    ALT 25 12/08/2017    ALKPHOS 103 12/08/2017    BILITOTAL 2.8 (H) 12/08/2017    SCOTTY 29 12/09/2017     Lab Results   Component Value Date    INR 1.31 (H) 12/11/2017     Lab Results   Component Value Date    BUN 28 12/15/2017    CR 1.06 12/15/2017     ON EXAMINATION:  Vitals:    12/15/17 0600 12/15/17 0700 12/15/17 0800 12/15/17 0900   BP:   128/68    BP Location:   Left arm    Pulse:       Resp: 14 18 15 16   Temp:   97.6  F (36.4  C)    TempSrc:   Axillary    SpO2:   97%    Weight:       Height:           Physical Exam:  Blood pressure 128/68, pulse 74, temperature 97.6  F (36.4  C), temperature source Axillary, resp. rate 16, height 1.778 m (5' 10\"), weight 124.7 kg (274 lb 14.6 oz), SpO2 97 %.    GEN: NAD, seated comfortably in chair  He is alert, appropriate, cooperative  Speech is expressive aphasia  Comprehension is difficult to assess due to aphasia, able to follow most simple commands with visual cues but struggles with more complex commands.  HEENT: NCAT  RESPIRATORY: unlabored on room air  MSK: full active " and passive ROM at all major joints of the bilaterally upper and lower extremities  No muscle atrophy noted  ABD: nondistended  NEURO:   CRANIAL NERVES:  Unable to follow commands for extraocular movements and visual fields full. Face moves symmetrically. Tongue midline. Hearing intact to finger rubbing.    Sensation: sensation to light touch intact throughout   Strength: 5/5 bilateral upper and lower extremities   Cognition: assessment limited due to aphasia  SKIN: no rashes or lesions noted.    EXT: no edema  PSYCH: normal affect.    Marilyn He  PM&R resident    Patient staffed with Dr. Ley.      I, Samanta Ley, I discussed the patient with my resident Dr. He on 12/15/17, and agree with the assessment and plan of care as documented in her note. I did not see the patient, but did review his chart including vital signs, labs, imaging, medications and provider notes. Due to patient's medical complexity, it appears he may be appropriate for acute rehab, however, may not be able to tolerate the level of therapy provided at this time and might be better served at TCU. Also, patient and wife preference would be for TCU closer to home or possibly ARU at the [facility] - may be appropriate if he continues to improve and is not discharged until later.     Thank you for this consultation. Please do not hesitate to contact me with further questions.   Samanta Ley MD  Physical Medicine and Rehabilitation  [phone]

## 2017-12-16 LAB — HAPTOGLOB SERPL-MCNC: <6 MG/DL (ref 35–175)

## 2017-12-16 NOTE — PLAN OF CARE
Problem: Patient Care Overview  Goal: Plan of Care/Patient Progress Review  Speech Language Therapy Discharge Summary    Reason for therapy discharge:    Discharged to transitional care facility.    Progress towards therapy goal(s). See goals on Care Plan in [facility] electronic health record for goal details.  Goals not met.  Barriers to achieving goals:   discharge from facility.    Therapy recommendation(s):    Continued therapy is recommended.  Rationale/Recommendations:  recommend SLP tx for aphasia and dysphagia at TCU. Patient was on dysphagia diet 2 and nectar thick liquids at the time of hospital discharge..

## 2017-12-16 NOTE — PLAN OF CARE
Problem: Patient Care Overview  Goal: Plan of Care/Patient Progress Review  Outcome: Adequate for Discharge Date Met: 12/16/17  Physical Therapy Discharge Summary    Reason for therapy discharge:    Discharged to transitional care facility.    Progress towards therapy goal(s). See goals on Care Plan in [facility] electronic health record for goal details.  Goals partially met.  Barriers to achieving goals:   discharge from facility.    Therapy recommendation(s):    Continued therapy is recommended.  Rationale/Recommendations:  TCU.

## 2017-12-16 NOTE — DISCHARGE SUMMARY
Pt picked up by [employer] at 1842.  Pt left via wheelchair in stable condition.  Neuro exam stable.  Spouse notified of patient's pickup.  AVS signed, given to spouse earlier in shift.

## 2017-12-17 LAB
BACTERIA SPEC CULT: NO GROWTH
SPECIMEN SOURCE: NORMAL

## 2018-03-09 ENCOUNTER — CARE COORDINATION (OUTPATIENT)
Dept: NEUROLOGY | Facility: CLINIC | Age: 73
End: 2018-03-09

## 2018-03-09 NOTE — PROGRESS NOTES
Patient was contacted to complete the mRS questionnaire. Patient's score was 3. Patient denies any concerns and [facility] Patient has contact information and was instructed to call with any new concerns.

## (undated) RX ORDER — LIDOCAINE HYDROCHLORIDE 10 MG/ML
INJECTION, SOLUTION EPIDURAL; INFILTRATION; INTRACAUDAL; PERINEURAL
Status: DISPENSED
Start: 2017-12-09

## (undated) RX ORDER — GLYCOPYRROLATE 0.2 MG/ML
INJECTION, SOLUTION INTRAMUSCULAR; INTRAVENOUS
Status: DISPENSED
Start: 2017-12-09

## (undated) RX ORDER — HEPARIN SODIUM 1000 [USP'U]/ML
INJECTION, SOLUTION INTRAVENOUS; SUBCUTANEOUS
Status: DISPENSED
Start: 2017-12-09

## (undated) RX ORDER — LABETALOL HYDROCHLORIDE 5 MG/ML
INJECTION, SOLUTION INTRAVENOUS
Status: DISPENSED
Start: 2017-12-09

## (undated) RX ORDER — HYDRALAZINE HYDROCHLORIDE 20 MG/ML
INJECTION INTRAMUSCULAR; INTRAVENOUS
Status: DISPENSED
Start: 2017-12-09

## (undated) RX ORDER — FENTANYL CITRATE 50 UG/ML
INJECTION, SOLUTION INTRAMUSCULAR; INTRAVENOUS
Status: DISPENSED
Start: 2017-12-09